# Patient Record
Sex: FEMALE | Race: WHITE | Employment: OTHER | URBAN - METROPOLITAN AREA
[De-identification: names, ages, dates, MRNs, and addresses within clinical notes are randomized per-mention and may not be internally consistent; named-entity substitution may affect disease eponyms.]

---

## 2017-01-31 DIAGNOSIS — E78.00 PURE HYPERCHOLESTEROLEMIA: ICD-10-CM

## 2017-01-31 DIAGNOSIS — K21.9 GASTROESOPHAGEAL REFLUX DISEASE WITHOUT ESOPHAGITIS: ICD-10-CM

## 2017-01-31 RX ORDER — FAMOTIDINE 40 MG/1
40 TABLET, FILM COATED ORAL DAILY
Qty: 90 TAB | Refills: 1 | Status: SHIPPED | OUTPATIENT
Start: 2017-01-31 | End: 2017-02-28 | Stop reason: ALTCHOICE

## 2017-01-31 RX ORDER — SIMVASTATIN 40 MG/1
40 TABLET, FILM COATED ORAL
Qty: 90 TAB | Refills: 1 | Status: SHIPPED | OUTPATIENT
Start: 2017-01-31 | End: 2017-06-23 | Stop reason: SDUPTHER

## 2017-01-31 NOTE — TELEPHONE ENCOUNTER
[id number] refill on simvastatin and famotidine. Pt is requesting they be mailed to her because she takes them to [address] [place] [address].

## 2017-02-28 ENCOUNTER — OFFICE VISIT (OUTPATIENT)
Dept: INTERNAL MEDICINE CLINIC | Age: 82
End: 2017-02-28

## 2017-02-28 VITALS
HEIGHT: 68 IN | WEIGHT: 137 LBS | OXYGEN SATURATION: 98 % | HEART RATE: 78 BPM | SYSTOLIC BLOOD PRESSURE: 128 MMHG | DIASTOLIC BLOOD PRESSURE: 78 MMHG | BODY MASS INDEX: 20.76 KG/M2 | TEMPERATURE: 98.2 F | RESPIRATION RATE: 16 BRPM

## 2017-02-28 DIAGNOSIS — Z13.39 SCREENING FOR ALCOHOLISM: ICD-10-CM

## 2017-02-28 DIAGNOSIS — R42 DIZZINESS AND GIDDINESS: Chronic | ICD-10-CM

## 2017-02-28 DIAGNOSIS — M81.0 OSTEOPOROSIS: ICD-10-CM

## 2017-02-28 DIAGNOSIS — I65.23 STENOSIS OF BOTH CAROTID ARTERIES WITHOUT INFARCTION: ICD-10-CM

## 2017-02-28 DIAGNOSIS — G40.209 COMPLEX PARTIAL SEIZURE WITH IMPAIRMENT OF CONSCIOUSNESS (HCC): ICD-10-CM

## 2017-02-28 DIAGNOSIS — E78.00 PURE HYPERCHOLESTEROLEMIA: ICD-10-CM

## 2017-02-28 DIAGNOSIS — Z13.31 SCREENING FOR DEPRESSION: ICD-10-CM

## 2017-02-28 DIAGNOSIS — Z71.89 ACP (ADVANCE CARE PLANNING): ICD-10-CM

## 2017-02-28 DIAGNOSIS — Z00.00 MEDICARE ANNUAL WELLNESS VISIT, SUBSEQUENT: Primary | ICD-10-CM

## 2017-02-28 DIAGNOSIS — J30.2 SEASONAL ALLERGIC RHINITIS, UNSPECIFIED ALLERGIC RHINITIS TRIGGER: ICD-10-CM

## 2017-02-28 DIAGNOSIS — K21.9 GASTROESOPHAGEAL REFLUX DISEASE WITHOUT ESOPHAGITIS: ICD-10-CM

## 2017-02-28 DIAGNOSIS — I67.89 CEREBRAL MICROVASCULAR DISEASE: ICD-10-CM

## 2017-02-28 DIAGNOSIS — G45.1 TRANSIENT ISCHEMIC ATTACK INVOLVING LEFT INTERNAL CAROTID ARTERY: ICD-10-CM

## 2017-02-28 RX ORDER — OMEPRAZOLE 40 MG/1
40 CAPSULE, DELAYED RELEASE ORAL DAILY
Qty: 90 CAP | Refills: 1 | Status: SHIPPED | OUTPATIENT
Start: 2017-02-28 | End: 2017-06-16

## 2017-02-28 RX ORDER — MONTELUKAST SODIUM 10 MG/1
10 TABLET ORAL DAILY
Qty: 90 TAB | Refills: 1 | Status: SHIPPED | OUTPATIENT
Start: 2017-02-28 | End: 2017-06-23 | Stop reason: SDUPTHER

## 2017-02-28 NOTE — MR AVS SNAPSHOT
Visit Information Date & Time Provider Department Dept. Phone Encounter #  
 2/28/2017 10:30 AM Aditya Colmenares, [address] [facility] [phone] [id number] Follow-up Instructions Return in about 1 year (around 2/28/2018). Your Appointments 11/13/2017  2:40 PM  
Follow Up with Preet Walter MD  
Neurology [facility] at [facility] [address]) Appt Note: F/U Seizure,CP,$0,adt,11/4/2016  
 [address], 
[address] [place] [address], [address] [phone]  
[address] , [address] [place] [address], [address] [phone] Upcoming Health Maintenance Date Due  
 MEDICARE YEARLY EXAM 8/24/2016 GLAUCOMA SCREENING Q2Y 8/8/2018 DTaP/Tdap/Td series (2 - Td) 8/26/2025 Allergies as of 2/28/2017  Review Complete On: 2/28/2017 By: Aditya Colmenares MD  
  
 Severity Noted Reaction Type Reactions Fosamax [Alendronate]  08/05/2010    Other (comments) GERD Keppra [Levetiracetam]  10/30/2014    Other (comments) \"IT DIDN'T WORK\" Lyrica [Pregabalin]  07/21/2010    Unknown (comments) Pravastatin  08/05/2010    Other (comments) Leg pain Current Immunizations  Reviewed on 2/28/2017 Name Date Influenza High Dose Vaccine PF 10/8/2016, 10/13/2015, 10/7/2014 Influenza Vaccine 11/1/2013 Influenza Vaccine Whole 9/15/2010 Pneumococcal Conjugate (PCV-13) 8/26/2015 Pneumococcal Vaccine (Unspecified Type) 11/1/2005 TD Vaccine 2/14/2006 Tdap 8/26/2015 Zoster Vaccine, Live 12/9/2009 Reviewed by Kirt Dorantes RN on 2/28/2017 at 10:39 AM  
You Were Diagnosed With   
  
 Codes Comments Medicare annual wellness visit, subsequent    -  Primary ICD-10-CM: Z00.00 ICD-9-CM: V70.0 ACP (advance care planning)     ICD-10-CM: Z71.89 ICD-9-CM: V65.49 Screening for alcoholism     ICD-10-CM: Z13.89 ICD-9-CM: V79.1  Screening for depression     ICD-10-CM: Z13.89 
 ICD-9-CM: V79.0 Gastroesophageal reflux disease without esophagitis     ICD-10-CM: K21.9 ICD-9-CM: 530.81 Pure hypercholesterolemia     ICD-10-CM: E78.00 ICD-9-CM: 272.0 Osteoporosis     ICD-10-CM: M81.0 ICD-9-CM: 733.00 Seasonal allergic rhinitis, unspecified allergic rhinitis trigger     ICD-10-CM: J30.2 ICD-9-CM: 477.9 Complex partial seizure with impairment of consciousness ([facility] [address]     ICD-10-CM: K82.921 ICD-9-CM: 345.40 Vitals BP  
  
  
  
  
  
 128/78 Vitals History BMI and BSA Data Body Mass Index Body Surface Area  
 20.98 kg/m 2 1.72 m 2 Preferred Pharmacy Pharmacy Name Phone [address] [facility] [phone] Your Updated Medication List  
  
   
This list is accurate as of: 2/28/17 11:22 AM.  Always use your most recent med list.  
  
  
  
  
 CALCIUM 500+D 500 mg(1,250mg) -200 unit per tablet Generic drug:  calcium-vitamin D Take 1 Tab by mouth two (2) times daily (with meals). CENTRUM SILVER Tab tablet Generic drug:  multivitamins-minerals-lutein Take 1 Tab by mouth daily. lacosamide 100 mg Tab tablet Commonly known as:  VIMPAT Take 1 Tab by mouth two (2) times a day. Max Daily Amount: 200 mg.  
  
 montelukast 10 mg tablet Commonly known as:  SINGULAIR Take 1 Tab by mouth daily. omeprazole 40 mg capsule Commonly known as:  PRILOSEC Take 1 Cap by mouth daily. PROLIA 60 mg/mL injection Generic drug:  denosumab 60 mg by SubCUTAneous route. Every 6 months  
  
 simvastatin 40 mg tablet Commonly known as:  ZOCOR Take 1 Tab by mouth nightly. Prescriptions Printed Refills  
 montelukast (SINGULAIR) 10 mg tablet 1 Sig: Take 1 Tab by mouth daily. Class: Print Route: Oral  
 omeprazole (PRILOSEC) 40 mg capsule 1 Sig: Take 1 Cap by mouth daily. Class: Print  Route: Oral  
  
 We Performed the Following CBC W/O DIFF [47273 CPT(R)] Mayra [address] [[id number] [facility]] LIPID PANEL [25679 CPT(R)] METABOLIC PANEL, COMPREHENSIVE [79314 CPT(R)] Follow-up Instructions Return in about 1 year (around 2/28/2018). Patient Instructions Medicare Wellness Visit, Female The best way to live healthy is to have a healthy lifestyle by eating a well-balanced diet, exercising regularly, limiting alcohol and stopping smoking. Regular physical exams and screening tests are another way to keep healthy. Preventive exams provided by your health care provider can find health problems before they become diseases or illnesses. Preventive services including immunizations, screening tests, monitoring and exams can help you take care of your own health. All people over age 72 should have a pneumovax  and and a prevnar shot to prevent pneumonia. These are once in a lifetime unless you and your provider decide differently. All people over 65 should have a yearly flu shot and a tetanus vaccine every 10 years. A bone mass density to screen for osteoporosis or thinning of the bones should be done every 2 years after 65. Screening for diabetes mellitus with a blood sugar test should be done every year. Glaucoma is a disease of the eye due to increased ocular pressure that can lead to blindness and it should be done every year by an eye professional. 
 
Cardiovascular screening tests that check for elevated lipids (fatty part of blood) which can lead to heart disease and strokes should be done every 5 years. Colorectal screening that evaluates for blood or polyps in your colon should be done yearly as a stool test or every five years as a flexible sigmoidoscope or every 10 years as a colonoscopy up to age 76. Breast cancer screening with a mammogram is recommended biennially  for women age 54-69. Screening for cervical cancer with a pap smear and pelvic exam is recommended for women after age 72 years every 2 years up to age 79 or when the provider and patient decide to stop. If there is a history of cervical abnormalities or other increased risk for cancer then the test is recommended yearly. Hepatitis C screening is also recommended for anyone born between [birth year] through [address]. A shingles vaccine is also recommended once in a lifetime after age 61. Your Medicare Wellness Exam is recommended annually. Here is a list of your current Health Maintenance items with a due date: 
Health Maintenance Due Topic Date Due  
 Annual Well Visit  08/24/2016 Advance Directives: Care Instructions Your Care Instructions An advance directive is a legal way to state your wishes at the end of your life. It tells your family and your doctor what to do if you can no longer say what you want. There are two main types of advance directives. You can change them any time that your wishes change. · A living will tells your family and your doctor your wishes about life support and other treatment. · A medical power of  lets you name a person to make treatment decisions for you when you can't speak for yourself. This person is called a health care agent. If you do not have an advance directive, decisions about your medical care may be made by a doctor or a  who doesn't know you. It may help to think of an advance directive as a gift to the people who care for you. If you have one, they won't have to make tough decisions by themselves. Follow-up care is a key part of your treatment and safety. Be sure to make and go to all appointments, and call your doctor if you are having problems. It's also a good idea to know your test results and keep a list of the medicines you take. How can you care for yourself at home? · Discuss your wishes with your loved ones and your doctor.  This way, there are no surprises. · Many states have a unique form. Or you might use a universal form that has been approved by many states. This kind of form can sometimes be completed and stored online. Your electronic copy will then be available wherever you have a connection to the Internet. In most cases, doctors will respect your wishes even if you have a form from a different state. · You don't need a  to do an advance directive. But you may want to get legal advice. · Think about these questions when you prepare an advance directive: ¨ Who do you want to make decisions about your medical care if you are not able to? Many people choose a family member, close friend, or doctor. ¨ Do you know enough about life support methods that might be used? If not, talk to your doctor so you understand. ¨ What are you most afraid of that might happen? You might be afraid of having pain, losing your independence, or being kept alive by machines. ¨ Where would you prefer to die? Choices include your home, a hospital, or a nursing home. ¨ Would you like to have information about hospice care to support you and your family? ¨ Do you want to donate organs when you die? ¨ Do you want certain Hindu practices performed before you die? If so, put your wishes in the advance directive. · Read your advance directive every year, and make changes as needed. When should you call for help? Be sure to contact your doctor if you have any questions. Where can you learn more? Go to http://tanna-leonel.info/. Enter R264 in the search box to learn more about \"Advance Directives: Care Instructions. \" Current as of: February 24, 2016 Content Version: 11.1 © 2678-6795 Healthwise, Incorporated. Care instructions adapted under license by [employer] (which disclaims liability or warranty for this information).  If you have questions about a medical condition or this instruction, always ask your healthcare professional. [place] [address] any warranty or liability for your use of this information. Introducing [facility] & [facility] SERVICES! Jackie Ganrett introduces [employer] patient portal. Now you can access parts of your medical record, email your doctor's office, and request medication refills online. 1. In your internet browser, go to [email or web address] 2. Click on the First Time User? Click Here link in the Sign In box. You will see the New Member Sign Up page. 3. Enter your [employer] Access Code exactly as it appears below. You will not need to use this code after youve completed the sign-up process. If you do not sign up before the expiration date, you must request a new code. · [employer] Access Code: [email or web address] Expires: 3/2/2017  2:56 PM 
 
4. Enter the last four digits of your Social Security Number (xxxx) and Date of Birth (mm/dd/yyyy) as indicated and click Submit. You will be taken to the next sign-up page. 5. Create a Kyte ID. This will be your [employer]te login ID and cannot be changed, so think of one that is secure and easy to remember. 6. Create a Kyte password. You can change your password at any time. 7. Enter your Password Reset Question and Answer. This can be used at a later time if you forget your password. 8. Enter your e-mail address. You will receive e-mail notification when new information is available in [address] 9. Click Sign Up. You can now view and download portions of your medical record. 10. Click the Download Summary menu link to download a portable copy of your medical information. If you have questions, please visit the Frequently Asked Questions section of the [employer] website. Remember, [employer] is NOT to be used for urgent needs. For medical emergencies, dial 911. Now available from your iPhone and Android! Please provide this summary of care documentation to your next provider. Your primary care clinician is listed as Toby Sandifer. If you have any questions after today's visit, please call [phone].

## 2017-02-28 NOTE — PROGRESS NOTES
Rene Bond is a 80 y.o. female who was seen in clinic today (2/28/2017). Patient was seen with Dr Mp Navarro (R3 at [facility]). Assessment & Plan:   Tanya Roy was seen today for complete physical.  Diagnoses and all orders for this visit:    Medicare annual wellness visit, subsequent    ACP (advance care planning)    Screening for alcoholism    Screening for depression  -     Depression Screen Annual    Gastroesophageal reflux disease without esophagitis- unclear how well controlled, will change to PPI to see if nausea improves. If not will go back to H2 blocker (Zantac is on formulary for her) and consider GI referral.  Red flags and expectations were reviewed & discussed with the her. -     omeprazole (PRILOSEC) 40 mg capsule; Take 1 Cap by mouth daily. Pure hypercholesterolemia- previously at goal, continue current treatment pending review of labs   -     METABOLIC PANEL, COMPREHENSIVE  -     CBC W/O DIFF  -     LIPID PANEL    Osteoporosis- monitored by specialist, defer to specialist     Seasonal allergic rhinitis, unspecified allergic rhinitis trigger- fluctuating, meds refilled, if no changes then to allergist for testing.  -     montelukast (SINGULAIR) 10 mg tablet; Take 1 Tab by mouth daily. Complex partial seizure with impairment of consciousness ([facility] [address]- defer to specialist          Follow-up Disposition:  Return in about 1 year (around 2/28/2018). ------------------------------------------------------------------------------------------    Subjective: Annual Wellness Visit- Subsequent Visit    End of Life Planning: This was discussed with her today and she has an advanced directive - a copy HAS NOT been provided. Reviewed DNR/DNI and patient is interested in remaining a DNR, no changes made.       Depression Screen:  PHQ 2 / 9, over the last two weeks 2/28/2017   Little interest or pleasure in doing things Not at all   Feeling down, depressed or hopeless Not at all   Total Score PHQ 2 0         Fall Risk:   Fall Risk Assessment, last 12 mths 2/28/2017   Able to walk? Yes   Fall in past 12 months? No   Fall with injury? -   Number of falls in past 12 months -   Fall Risk Score -       Abuse Screen:  Abuse Screening Questionnaire 2/28/2017   Do you ever feel afraid of your partner? N   Are you in a relationship with someone who physically or mentally threatens you? N   Is it safe for you to go home? Y         Alcohol Risk Factor Screening: On any occasion during the past 3 months, have you had more than 3 drinks containing alcohol? No  Do you average more than 7 drinks per week? No    Hearing Loss:  mild    Activities of Daily Living:  Self-care. Requires assistance with: no ADLs    Adult Nutrition Screen:  No risk factors noted. Health Maintenance  Daily Aspirin: no  Bone Density: UTD- done in 8/14  Glaucoma Screening: Yes    Immunizations:    Influenza: up to date. Tetanus: up to date. Shingles: up to date. Pneumonia: up to date. Cancer screening:    Cervical: reviewed guidelines, n/a. Breast: reviewed guidelines, unknown, record requested. Colon: up to date. Patient Care Team:  Alyssia Carmichael MD as PCP - General (Internal Medicine)  Gracie Andrade MD (Gynecology)  Rayray Carrasco MD (Neurology)  Corwin Rodriguez MD (Dermatology)  Cecy Arteaga MD (Dermatology)  Kenji Liao DO (Ophthalmology)       The following sections were reviewed & updated as appropriate: PMH, PSH, FH, and SH.       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 Authorizing Provider   famotidine (PEPCID) 40 mg tablet Take 1 Tab by mouth daily. 1/31/17  Yes Christy Hays MD   simvastatin (ZOCOR) 40 mg tablet Take 1 Tab by mouth nightly. 1/31/17  Yes Christy Hays MD   lacosamide (VIMPAT) 100 mg tab tablet Take 1 Tab by mouth two (2) times a day. Max Daily Amount: 200 mg. 9/20/16  Yes Deysi Garcia MD   montelukast (SINGULAIR) 10 mg tablet Take 1 Tab by mouth daily. 8/25/16  Yes Christy Hays MD   multivitamins-minerals-lutein (CENTRUM SILVER) tab tablet Take 1 Tab by mouth daily. Yes Historical Provider   Denosumab (PROLIA) 60 mg/mL injection 60 mg by SubCUTAneous route. Every 6 months   Yes Historical Provider   calcium-vitamin D (CALCIUM 500+D) 500 mg(1,250mg) -200 unit per tablet Take 1 Tab by mouth two (2) times daily (with meals). Yes Historical Provider          Allergies   Allergen Reactions    Fosamax [Alendronate] Other (comments)     GERD    Keppra [Levetiracetam] Other (comments)     \"IT DIDN'T WORK\"    Lyrica [Pregabalin] Unknown (comments)    Pravastatin Other (comments)     Leg pain              Review of Systems   Constitutional: Negative for chills and fever. HENT:        Intermittent bleeding from left upper gums   Respiratory: Negative for cough and shortness of breath. Cardiovascular: Negative for chest pain and palpitations. Gastrointestinal: Positive for nausea (2-3 times per day, daily, no obvious triggers). Negative for abdominal pain, blood in stool, constipation, diarrhea, heartburn and vomiting. Musculoskeletal: Negative for joint pain and myalgias. Neurological: Negative for tingling, focal weakness and headaches. Endo/Heme/Allergies: Does not bruise/bleed easily. Psychiatric/Behavioral: Negative for depression.  The patient is not nervous/anxious and does not have insomnia. Objective:   Physical Exam   Constitutional: She appears well-developed. No distress. HENT:   Right Ear: Tympanic membrane, external ear and ear canal normal.   Left Ear: Tympanic membrane, external ear and ear canal normal.   Nose: Nose normal.   Mouth/Throat: Uvula is midline, oropharynx is clear and moist and mucous membranes are normal. No posterior oropharyngeal erythema. Right ear is 75% occluded with wet cerumen. Left ear is 75% occluded with wet cerumen. Ear canals are narrow   Eyes: Conjunctivae and lids are normal. No scleral icterus. Neck: Neck supple. Carotid bruit is not present. No thyromegaly present. Mass, R sided of the neck, 2cm, mobile, non tender   Cardiovascular: Regular rhythm and normal heart sounds. No murmur heard. Pulses:       Dorsalis pedis pulses are 2+ on the right side, and 2+ on the left side. Posterior tibial pulses are 2+ on the right side, and 2+ on the left side. Pulmonary/Chest: Effort normal and breath sounds normal. She has no wheezes. She has no rales. Abdominal: Soft. Bowel sounds are normal. She exhibits no mass. There is no hepatosplenomegaly. There is no tenderness. Musculoskeletal: Normal range of motion. She exhibits no edema. Cervical back: Normal.        Thoracic back: She exhibits no bony tenderness. Lumbar back: Normal.   Neurological: She has normal strength. No cranial nerve deficit or sensory deficit. Skin: No rash noted. Scab on the right shin: 2x6cm   Psychiatric: She has a normal mood and affect.  Her behavior is normal.          Visit Vitals    /78    Pulse 78    Temp 98.2 °F (36.8 °C) (Oral)    Resp 16    Ht 5' 7.75\" (1.721 m)    Wt 137 lb (62.1 kg)    SpO2 98%    BMI 20.98 kg/m2          Advised her to call back or return to office if symptoms worsen/change/persist.  Discussed expected course/resolution/complications of diagnosis in detail with patient. Medication risks/benefits/costs/interactions/alternatives discussed with patient. She was given an after visit summary which includes diagnoses, current medications, & vitals. She expressed understanding with the diagnosis and plan.         Teresa Martinez MD

## 2017-02-28 NOTE — ACP (ADVANCE CARE PLANNING)
Advance Care Planning (ACP) Provider Note - Comprehensive     Date of ACP Conversation: 02/28/17  Persons included in Conversation:  patient      Authorized Decision Maker (if patient is incapable of making informed decisions): This person is:  Healthcare Agent/Medical Power of  under Advance Directive        General ACP for ALL Patients with Decision Making Capacity:  Importance of advance care planning, including choosing a healthcare agent to communicate patient's healthcare decisions if patient lost the ability to make decisions, such as after a sudden illness or accident  Understanding of the healthcare agent role was assessed and information provided  Opportunity offered to explore how cultural, Zoroastrian, spiritual, or personal beliefs would affect decisions for future care     For Serious or Chronic Illness:  Her medical problems were reviewed with them. Understanding of medical condition    Understanding of CPR, goals and expected outcomes, benefits and burdens discussed. Information on CPR success rates provided (e.g. for CPR in hospital, survival to d/c at two weeks is 22%, for chronically ill or elderly/frail survival is less than 3%); Individual asked to communicate understanding of information in his/her own words. Interventions Provided:  Recommended communicating the plan and making copies for the healthcare agent, personal physician, and others as appropriate (e.g., health system)  Recommended review of completed ACP document annually or upon change in health status      She has an advanced directive - a copy HAS NOT been provided. She thought we had one on file. She will get me a copy for her records. Reviewed DNR/DNI and patient is interested in remaining a DNR, no changes made.

## 2017-02-28 NOTE — PATIENT INSTRUCTIONS
Medicare Wellness Visit, Female    The best way to live healthy is to have a healthy lifestyle by eating a well-balanced diet, exercising regularly, limiting alcohol and stopping smoking. Regular physical exams and screening tests are another way to keep healthy. Preventive exams provided by your health care provider can find health problems before they become diseases or illnesses. Preventive services including immunizations, screening tests, monitoring and exams can help you take care of your own health. All people over age 72 should have a pneumovax  and and a prevnar shot to prevent pneumonia. These are once in a lifetime unless you and your provider decide differently. All people over 65 should have a yearly flu shot and a tetanus vaccine every 10 years. A bone mass density to screen for osteoporosis or thinning of the bones should be done every 2 years after 65. Screening for diabetes mellitus with a blood sugar test should be done every year. Glaucoma is a disease of the eye due to increased ocular pressure that can lead to blindness and it should be done every year by an eye professional.    Cardiovascular screening tests that check for elevated lipids (fatty part of blood) which can lead to heart disease and strokes should be done every 5 years. Colorectal screening that evaluates for blood or polyps in your colon should be done yearly as a stool test or every five years as a flexible sigmoidoscope or every 10 years as a colonoscopy up to age 76. Breast cancer screening with a mammogram is recommended biennially  for women age 54-69. Screening for cervical cancer with a pap smear and pelvic exam is recommended for women after age 72 years every 2 years up to age 79 or when the provider and patient decide to stop. If there is a history of cervical abnormalities or other increased risk for cancer then the test is recommended yearly.     Hepatitis C screening is also recommended for anyone born between [birth year] through [address]. A shingles vaccine is also recommended once in a lifetime after age 61. Your Medicare Wellness Exam is recommended annually. Here is a list of your current Health Maintenance items with a due date:  Health Maintenance Due   Topic Date Due    Annual Well Visit  08/24/2016            Advance Directives: Care Instructions  Your Care Instructions  An advance directive is a legal way to state your wishes at the end of your life. It tells your family and your doctor what to do if you can no longer say what you want. There are two main types of advance directives. You can change them any time that your wishes change. · A living will tells your family and your doctor your wishes about life support and other treatment. · A medical power of  lets you name a person to make treatment decisions for you when you can't speak for yourself. This person is called a health care agent. If you do not have an advance directive, decisions about your medical care may be made by a doctor or a  who doesn't know you. It may help to think of an advance directive as a gift to the people who care for you. If you have one, they won't have to make tough decisions by themselves. Follow-up care is a key part of your treatment and safety. Be sure to make and go to all appointments, and call your doctor if you are having problems. It's also a good idea to know your test results and keep a list of the medicines you take. How can you care for yourself at home? · Discuss your wishes with your loved ones and your doctor. This way, there are no surprises. · Many states have a unique form. Or you might use a universal form that has been approved by many states. This kind of form can sometimes be completed and stored online. Your electronic copy will then be available wherever you have a connection to the Internet.  In most cases, doctors will respect your wishes even if you have a form from a different state. · You don't need a  to do an advance directive. But you may want to get legal advice. · Think about these questions when you prepare an advance directive:  ¨ Who do you want to make decisions about your medical care if you are not able to? Many people choose a family member, close friend, or doctor. ¨ Do you know enough about life support methods that might be used? If not, talk to your doctor so you understand. ¨ What are you most afraid of that might happen? You might be afraid of having pain, losing your independence, or being kept alive by machines. ¨ Where would you prefer to die? Choices include your home, a hospital, or a nursing home. ¨ Would you like to have information about hospice care to support you and your family? ¨ Do you want to donate organs when you die? ¨ Do you want certain Orthodox practices performed before you die? If so, put your wishes in the advance directive. · Read your advance directive every year, and make changes as needed. When should you call for help? Be sure to contact your doctor if you have any questions. Where can you learn more? Go to http://tanna-leonel.info/. Enter R264 in the search box to learn more about \"Advance Directives: Care Instructions. \"  Current as of: February 24, 2016  Content Version: 11.1  © 4554-6522 [employer]. Care instructions adapted under license by [employer] (which disclaims liability or warranty for this information). If you have questions about a medical condition or this instruction, always ask your healthcare professional. [place] [address] any warranty or liability for your use of this information.

## 2017-03-01 ENCOUNTER — HOSPITAL ENCOUNTER (OUTPATIENT)
Dept: LAB | Age: 82
Discharge: HOME | End: 2017-03-01
Payer: MEDICARE

## 2017-03-01 PROCEDURE — 85027 COMPLETE CBC AUTOMATED: CPT

## 2017-03-01 PROCEDURE — 80061 LIPID PANEL: CPT

## 2017-03-01 PROCEDURE — 80053 COMPREHEN METABOLIC PANEL: CPT

## 2017-03-01 PROCEDURE — 36415 COLL VENOUS BLD VENIPUNCTURE: CPT

## 2017-03-01 RX ORDER — LACOSAMIDE 100 MG/1
100 TABLET ORAL 2 TIMES DAILY
Qty: 180 TAB | Refills: 3 | Status: SHIPPED | OUTPATIENT
Start: 2017-03-01 | End: 2017-10-09 | Stop reason: SDUPTHER

## 2017-03-01 NOTE — TELEPHONE ENCOUNTER
Future Appointments  Date Time Provider Abraham Robles   11/13/2017 2:40 PM Hadley Hensley MD [address]   3/1/2018 10:30 AM Samantha Luna MD [id number] [facility]                         Last Appointment My Department:  11/8/2016    Please advise of refill below. Requested Prescriptions     Pending Prescriptions Disp Refills    lacosamide (VIMPAT) 100 mg tab tablet 180 Tab 3     Sig: Take 1 Tab by mouth two (2) times a day. Max Daily Amount: 200 mg.      Advised patient that I can fax this directly to her pharmacy once approved

## 2017-03-02 LAB
ALBUMIN SERPL-MCNC: 3.7 G/DL (ref 3.5–4.8)
ALBUMIN/GLOB SERPL: 1.4 {RATIO} (ref 1.1–2.5)
ALP SERPL-CCNC: 87 IU/L (ref 39–117)
ALT SERPL-CCNC: 15 IU/L (ref 0–32)
AST SERPL-CCNC: 21 IU/L (ref 0–40)
BILIRUB SERPL-MCNC: 0.2 MG/DL (ref 0–1.2)
BUN SERPL-MCNC: 16 MG/DL (ref 8–27)
BUN/CREAT SERPL: 24 (ref 11–26)
CALCIUM SERPL-MCNC: 9.2 MG/DL (ref 8.7–10.3)
CHLORIDE SERPL-SCNC: 104 MMOL/L (ref 96–106)
CHOLEST SERPL-MCNC: 148 MG/DL (ref 100–199)
CO2 SERPL-SCNC: 23 MMOL/L (ref 18–29)
CREAT SERPL-MCNC: 0.67 MG/DL (ref 0.57–1)
ERYTHROCYTE [DISTWIDTH] IN BLOOD BY AUTOMATED COUNT: 14 % (ref 12.3–15.4)
GLOBULIN SER CALC-MCNC: 2.7 G/DL (ref 1.5–4.5)
GLUCOSE SERPL-MCNC: 83 MG/DL (ref 65–99)
HCT VFR BLD AUTO: 41.5 % (ref 34–46.6)
HDLC SERPL-MCNC: 48 MG/DL
HGB BLD-MCNC: 13.7 G/DL (ref 11.1–15.9)
LDLC SERPL CALC-MCNC: 68 MG/DL (ref 0–99)
MCH RBC QN AUTO: 28.8 PG (ref 26.6–33)
MCHC RBC AUTO-ENTMCNC: 33 G/DL (ref 31.5–35.7)
MCV RBC AUTO: 87 FL (ref 79–97)
PLATELET # BLD AUTO: 304 X10E3/UL (ref 150–379)
POTASSIUM SERPL-SCNC: 4.3 MMOL/L (ref 3.5–5.2)
PROT SERPL-MCNC: 6.4 G/DL (ref 6–8.5)
RBC # BLD AUTO: 4.76 X10E6/UL (ref 3.77–5.28)
SODIUM SERPL-SCNC: 142 MMOL/L (ref 134–144)
TRIGL SERPL-MCNC: 160 MG/DL (ref 0–149)
VLDLC SERPL CALC-MCNC: 32 MG/DL (ref 5–40)
WBC # BLD AUTO: 5.5 X10E3/UL (ref 3.4–10.8)

## 2017-03-03 ENCOUNTER — DOCUMENTATION ONLY (OUTPATIENT)
Dept: NEUROLOGY | Age: 82
End: 2017-03-03

## 2017-03-10 ENCOUNTER — TELEPHONE (OUTPATIENT)
Dept: INTERNAL MEDICINE CLINIC | Age: 82
End: 2017-03-10

## 2017-03-13 NOTE — TELEPHONE ENCOUNTER
Called and spoke with pt and pt does not agree with the information from Dr Maricel Mendieta she states her bones are more important to her than her stomach right now and is afraid to take omeprazole and states she will take OTC medications and maybe pepcid and will talk to Dr Maricel Mendieta when she comes back in.

## 2017-03-13 NOTE — TELEPHONE ENCOUNTER
Long term use of this medication is associated w/ fx. Not short term use. I would recommend trying it for 3-6 months. Lets see how well her GERD sx are and then we can re-evaluate.

## 2017-03-13 NOTE — TELEPHONE ENCOUNTER
Called pt who states she read paperwork for omeprazole that states people who have osteoporosis should not take it. That is can cause fractures. Pt states could Dr Hare Pro recommend something else that would not affect her bones. Pt states she is taking rolaids, pepto bismol and gingerale and she is fine with taking that if cannot recommend something else.

## 2017-06-16 ENCOUNTER — OFFICE VISIT (OUTPATIENT)
Dept: INTERNAL MEDICINE CLINIC | Age: 82
End: 2017-06-16

## 2017-06-16 VITALS
HEIGHT: 68 IN | RESPIRATION RATE: 12 BRPM | DIASTOLIC BLOOD PRESSURE: 74 MMHG | BODY MASS INDEX: 20.76 KG/M2 | OXYGEN SATURATION: 97 % | HEART RATE: 78 BPM | TEMPERATURE: 97.8 F | WEIGHT: 137 LBS | SYSTOLIC BLOOD PRESSURE: 132 MMHG

## 2017-06-16 DIAGNOSIS — G89.29 CHRONIC LEFT-SIDED LOW BACK PAIN WITHOUT SCIATICA: ICD-10-CM

## 2017-06-16 DIAGNOSIS — M79.89 LEFT LEG SWELLING: Primary | ICD-10-CM

## 2017-06-16 DIAGNOSIS — M54.50 CHRONIC LEFT-SIDED LOW BACK PAIN WITHOUT SCIATICA: ICD-10-CM

## 2017-06-16 RX ORDER — MONTELUKAST SODIUM 10 MG/1
10 TABLET ORAL DAILY
Qty: 90 TAB | Refills: 1 | Status: CANCELLED | OUTPATIENT
Start: 2017-06-16

## 2017-06-16 RX ORDER — SIMVASTATIN 40 MG/1
40 TABLET, FILM COATED ORAL
Qty: 90 TAB | Refills: 1 | Status: CANCELLED | OUTPATIENT
Start: 2017-06-16

## 2017-06-16 RX ORDER — CLINDAMYCIN HYDROCHLORIDE 300 MG/1
300 CAPSULE ORAL 4 TIMES DAILY
COMMUNITY
Start: 2017-06-13 | End: 2017-06-17

## 2017-06-16 NOTE — MR AVS SNAPSHOT
Visit Information Date & Time Provider Department Dept. Phone Encounter #  
 6/16/2017 12:45 PM Giuliana Srivastava, [facility] Internal Medicine [phone] [id number] Follow-up Instructions Return if symptoms worsen or fail to improve. Your Appointments 11/13/2017  2:40 PM  
Follow Up with Josias Rodriguez MD  
[facility]) Appt Note: F/U Seizure,CP,$0,adt,11/4/2016  
 [address] [facility], 
[address] [place] [address], [address] [phone]  
[address] [facility], [address] [facility], [address] [address]  
  
    
 3/1/2018 10:30 AM  
Medicare Physical with Giuliana Srivastava MD  
[facility] Internal Medicine [facility]) Appt Note: CPE - wellness [address] [facility] [address]  
[id number] [address] Upcoming Health Maintenance Date Due INFLUENZA AGE 9 TO ADULT 8/1/2017 MEDICARE YEARLY EXAM 3/1/2018 GLAUCOMA SCREENING Q2Y 4/18/2019 DTaP/Tdap/Td series (2 - Td) 8/26/2025 Allergies as of 6/16/2017  Review Complete On: 6/16/2017 By: Giuliana Srivastava MD  
  
 Severity Noted Reaction Type Reactions Fosamax [Alendronate]  08/05/2010    Other (comments) GERD Keppra [Levetiracetam]  10/30/2014    Other (comments) \"IT DIDN'T WORK\" Lyrica [Pregabalin]  07/21/2010    Unknown (comments) Pravastatin  08/05/2010    Other (comments) Leg pain Current Immunizations  Reviewed on 6/16/2017 Name Date Influenza High Dose Vaccine PF 10/8/2016, 10/13/2015, 10/7/2014 Influenza Vaccine 11/1/2013 Influenza Vaccine Whole 9/15/2010 Pneumococcal Conjugate (PCV-13) 8/26/2015 Pneumococcal Vaccine (Unspecified Type) 11/1/2005 TD Vaccine 2/14/2006 Tdap 8/26/2015 Zoster Vaccine, Live 12/9/2009  Reviewed by Steven Patino RN on 6/16/2017 at 12:58 PM  
 You Were Diagnosed With   
  
 Codes Comments Left leg swelling    -  Primary ICD-10-CM: M79.89 ICD-9-CM: 729.81 Chronic left-sided low back pain without sciatica     ICD-10-CM: M54.5, G89.29 ICD-9-CM: 724.2, 338.29 Vitals BP Pulse Temp Resp Height(growth percentile) Weight(growth percentile) 152/66 78 97.8 °F (36.6 °C) (Oral) 12 5' 7.75\" (1.721 m) 137 lb (62.1 kg) SpO2 BMI OB Status Smoking Status 97% 20.98 kg/m2 Hysterectomy Former Smoker Vitals History BMI and BSA Data Body Mass Index Body Surface Area  
 20.98 kg/m 2 1.72 m 2 Preferred Pharmacy Pharmacy Name Phone [address] [facility] [phone] Your Updated Medication List  
  
   
This list is accurate as of: 6/16/17  1:22 PM.  Always use your most recent med list.  
  
  
  
  
 CALCIUM 500+D 500 mg(1,250mg) -200 unit per tablet Generic drug:  calcium-vitamin D Take 1 Tab by mouth two (2) times daily (with meals). CENTRUM SILVER Tab tablet Generic drug:  multivitamins-minerals-lutein Take 1 Tab by mouth daily. clindamycin 300 mg capsule Commonly known as:  CLEOCIN Take 300 mg by mouth four (4) times daily. lacosamide 100 mg Tab tablet Commonly known as:  VIMPAT Take 1 Tab by mouth two (2) times a day. Max Daily Amount: 200 mg.  
  
 montelukast 10 mg tablet Commonly known as:  SINGULAIR Take 1 Tab by mouth daily. PROLIA 60 mg/mL injection Generic drug:  denosumab 60 mg by SubCUTAneous route. Every 6 months  
  
 simvastatin 40 mg tablet Commonly known as:  ZOCOR Take 1 Tab by mouth nightly. We Performed the Following REFERRAL TO PHYSICAL THERAPY [HUO18 Custom] Follow-up Instructions Return if symptoms worsen or fail to improve. Referral Information  Referral ID Referred By Referred To  
  
 [id number] MARY IVAN [facility] OP PT [facility]   
 [address] [facility] [address] [facility] Phone: [phone] Fax: [phone] Visits Status Start Date End Date 1 New Request 6/16/17 6/16/18 If your referral has a status of pending review or denied, additional information will be sent to support the outcome of this decision. Patient Instructions Learning About RICE (Rest, Ice, Compression, and Elevation) What is RICE? RICE is a way to care for an injury. RICE helps relieve pain and swelling. It may also help with healing and flexibility. RICE stands for: · Rest and protect the injured or sore area. · Ice or a cold pack used as soon as possible. · Compression, or wrapping the injured or sore area with an elastic bandage. · Elevation (propping up) the injured or sore area. How do you do RICE? You can use RICE for home treatment when you have general aches and pains or after an injury or surgery. Rest 
· Do not put weight on the injury for at least 24 to 48 hours. · Use crutches for a badly sprained knee or ankle. · Support a sprained wrist, elbow, or shoulder with a sling. Ice · Put ice or a cold pack on the injury right away to reduce pain and swelling. Frozen vegetables will also work as an ice pack. Put a thin cloth between the ice or cold pack and your skin. The cloth protects the injured area from getting too cold. · Use ice for 10 to 15 minutes at a time for the first 48 to 72 hours. Compression · Use compression for sprains, strains, and surgeries of the arms and legs. · Wrap the injured area with an elastic bandage or compression sleeve to reduce swelling. · Don't wrap it too tightly. If the area below it feels numb, tingles, or feels cool, loosen the wrap. Elevation · Use elevation for areas of the body that can be propped up, such as arms and legs. · Prop up the injured area on pillows whenever you use ice. Keep it propped up anytime you sit or lie down. · Try to keep the injured area at or above the level of your heart. This will help reduce swelling and bruising. Where can you learn more? Go to http://tanna-leonel.info/. Enter Y203 in the search box to learn more about \"Learning About RICE (Rest, Ice, Compression, and Elevation). \" 
Current as of: May 23, 2016 Content Version: 11.2 © 0206-1869 [employer]. Care instructions adapted under license by [employer] (which disclaims liability or warranty for this information). If you have questions about a medical condition or this instruction, always ask your healthcare professional. [address] any warranty or liability for your use of this information. Introducing [facility]! [facility] introduces Lollipuff patient portal. Now you can access parts of your medical record, email your doctor's office, and request medication refills online. 1. In your internet browser, go to [email or web address] "[email or web address]/[email or web address] 2. Click on the First Time User? Click Here link in the Sign In box. You will see the New Member Sign Up page. 3. Enter your Lollipuff Access Code exactly as it appears below. You will not need to use this code after youve completed the sign-up process. If you do not sign up before the expiration date, you must request a new code. · Lollipuff Access Code: [email or web address] Expires: 9/14/2017 12:53 PM 
 
4. Enter the last four digits of your Social Security Number (xxxx) and Date of Birth (mm/dd/yyyy) as indicated and click Submit. You will be taken to the next sign-up page. 5. Create a Lollipuff ID. This will be your Lollipuff login ID and cannot be changed, so think of one that is secure and easy to remember. 6. Create a Lollipuff password. You can change your password at any time. 7. Enter your Password Reset Question and Answer. This can be used at a later time if you forget your password. 8. Enter your e-mail address. You will receive e-mail notification when new information is available in [address] 9. Click Sign Up. You can now view and download portions of your medical record. 10. Click the Download Summary menu link to download a portable copy of your medical information. If you have questions, please visit the Frequently Asked Questions section of the iGlue website. Remember, iGlue is NOT to be used for urgent needs. For medical emergencies, dial 911. Now available from your iPhone and Android! Please provide this summary of care documentation to your next provider. Your primary care clinician is listed as Damion Flanagan. If you have any questions after today's visit, please call [phone].

## 2017-06-16 NOTE — PROGRESS NOTES
Car Oquendo is a [de-identified] y.o. female who was seen in clinic today (6/16/2017). Assessment & Plan:  Diagnoses and all orders for this visit:    1. Left leg swelling- this is a new problem, symptoms are: improved, differential dx reviewed with the patient, could be a spider bite. Will finish abx. Will continue to monitor. Red flags were reviewed with the patient to RTC or notify me, expected time course for resolution reviewed. 2. Chronic left-sided low back pain without sciatica- this has been on/off, no issues currently, asking for another referral to PT to use since it has helped. Follow-up Disposition: Not on File       ----------------------------------------------------------------------    Subjective:  Jorge Agustin was seen today for Insect Bite    Dermatology Review  She is here to talk about concerns about a spider bite. She noticed it 5 days ago, with gradually improving since that time. Location: left leg. Symptoms include swelling of the leg and two red marks. She was seen at [facility] on Tuesday (6/13). Labs reviewed. She reports US was negative for a DVT. She was given clindamycin, she is taking this as directed & without any side effects. She denies: fevers or chills, headache, or arthralgias. Prior to Admission medications    Medication Sig Start Date End Date Taking? Authorizing Provider   clindamycin (CLEOCIN) 300 mg capsule Take 300 mg by mouth four (4) times daily. 6/13/17 6/17/17 Yes Historical Provider   lacosamide (VIMPAT) 100 mg tab tablet Take 1 Tab by mouth two (2) times a day. Max Daily Amount: 200 mg. 3/1/17  Yes Nany Hensley MD   montelukast (SINGULAIR) 10 mg tablet Take 1 Tab by mouth daily. 2/28/17  Yes Butch Dickey MD   simvastatin (ZOCOR) 40 mg tablet Take 1 Tab by mouth nightly. 1/31/17  Yes Butch Dickey MD   multivitamins-minerals-lutein (CENTRUM SILVER) tab tablet Take 1 Tab by mouth daily.    Yes Historical Provider   Denosumab (PROLIA) 60 mg/mL injection 60 mg by SubCUTAneous route. Every 6 months   Yes Historical Provider   calcium-vitamin D (CALCIUM 500+D) 500 mg(1,250mg) -200 unit per tablet Take 1 Tab by mouth two (2) times daily (with meals). Yes Historical Provider          Allergies   Allergen Reactions    Fosamax [Alendronate] Other (comments)     GERD    Keppra [Levetiracetam] Other (comments)     \"IT DIDN'T WORK\"    Lyrica [Pregabalin] Unknown (comments)    Pravastatin Other (comments)     Leg pain           ROS : per HPI       Objective:   Physical Exam   Constitutional: No distress. Cardiovascular: Regular rhythm and normal heart sounds. No murmur heard. Pulmonary/Chest: Effort normal and breath sounds normal. She has no wheezes. She has no rales. Musculoskeletal: She exhibits no edema. Left lower leg: She exhibits swelling (L upper calf is larger in size then R, no piting edema) and deformity (mid, posterior calf there are two 7mm circular ecchymosis next to each other, 3mm apart). She exhibits no tenderness and no bony tenderness. Visit Vitals    /74    Pulse 78    Temp 97.8 °F (36.6 °C) (Oral)    Resp 12    Ht 5' 7.75\" (1.721 m)    Wt 137 lb (62.1 kg)    SpO2 97%    BMI 20.98 kg/m2         Disclaimer:  Advised her to call back or return to office if symptoms worsen/change/persist.  Discussed expected course/resolution/complications of diagnosis in detail with patient. Medication risks/benefits/costs/interactions/alternatives discussed with patient. She was given an after visit summary which includes diagnoses, current medications, & vitals. She expressed understanding with the diagnosis and plan.         Collin Qureshi MD

## 2017-06-16 NOTE — PROGRESS NOTES
Originally went to urgent care because leg was swollen. Was told she had a spider bite. Requesting order for PT for her back.

## 2017-06-16 NOTE — PATIENT INSTRUCTIONS
Learning About RICE (Rest, Ice, Compression, and Elevation)  What is RICE? RICE is a way to care for an injury. RICE helps relieve pain and swelling. It may also help with healing and flexibility. RICE stands for:  · Rest and protect the injured or sore area. · Ice or a cold pack used as soon as possible. · Compression, or wrapping the injured or sore area with an elastic bandage. · Elevation (propping up) the injured or sore area. How do you do RICE? You can use RICE for home treatment when you have general aches and pains or after an injury or surgery. Rest  · Do not put weight on the injury for at least 24 to 48 hours. · Use crutches for a badly sprained knee or ankle. · Support a sprained wrist, elbow, or shoulder with a sling. Ice  · Put ice or a cold pack on the injury right away to reduce pain and swelling. Frozen vegetables will also work as an ice pack. Put a thin cloth between the ice or cold pack and your skin. The cloth protects the injured area from getting too cold. · Use ice for 10 to 15 minutes at a time for the first 48 to 72 hours. Compression  · Use compression for sprains, strains, and surgeries of the arms and legs. · Wrap the injured area with an elastic bandage or compression sleeve to reduce swelling. · Don't wrap it too tightly. If the area below it feels numb, tingles, or feels cool, loosen the wrap. Elevation  · Use elevation for areas of the body that can be propped up, such as arms and legs. · Prop up the injured area on pillows whenever you use ice. Keep it propped up anytime you sit or lie down. · Try to keep the injured area at or above the level of your heart. This will help reduce swelling and bruising. Where can you learn more? Go to http://tanna-leonel.info/. Enter Y743 in the search box to learn more about \"Learning About RICE (Rest, Ice, Compression, and Elevation). \"  Current as of: May 23, 2016  Content Version: 11.2  © 2032-6852 [employer]. Care instructions adapted under license by [employer] (which disclaims liability or warranty for this information). If you have questions about a medical condition or this instruction, always ask your healthcare professional. [address] any warranty or liability for your use of this information.

## 2017-06-19 ENCOUNTER — TELEPHONE (OUTPATIENT)
Dept: INTERNAL MEDICINE CLINIC | Age: 82
End: 2017-06-19

## 2017-06-19 NOTE — TELEPHONE ENCOUNTER
Patient states the rash that was on her ankles when she saw Dr. Kamron Pineda on Friday is now on her neck, stomach and back. She worked in the yard for 3 hours yesterday. Asked if he would send in a prescription for something.

## 2017-06-19 NOTE — TELEPHONE ENCOUNTER
Need more clarification. She was seen for suspected spider bite (she had 2 purplish lesions). What is this new rash she is talking about.

## 2017-06-20 NOTE — TELEPHONE ENCOUNTER
Called pt back and pt states she has a bumpy rash now on the back of her neck and on her back. She stated she showed it to Dr Jacques Harrison and stated she thought it may be poison ivy and he stated no it is not and to apply lotion to it. She stated the 2 purple lesions are almost gone. She suspects she may of gotten the rash from the yard. She states now her stomach is now beginning to itch too. She states benadryl is helping some.

## 2017-06-22 RX ORDER — PREDNISONE 10 MG/1
TABLET ORAL
Qty: 30 TAB | Refills: 0 | Status: SHIPPED | OUTPATIENT
Start: 2017-06-22 | End: 2017-10-03 | Stop reason: ALTCHOICE

## 2017-09-04 DIAGNOSIS — J30.2 SEASONAL ALLERGIC RHINITIS, UNSPECIFIED ALLERGIC RHINITIS TRIGGER: ICD-10-CM

## 2017-09-04 RX ORDER — MONTELUKAST SODIUM 10 MG/1
TABLET ORAL
Qty: 90 TAB | Refills: 1 | Status: SHIPPED | OUTPATIENT
Start: 2017-09-04 | End: 2018-03-01 | Stop reason: SDUPTHER

## 2017-10-01 DIAGNOSIS — E78.00 PURE HYPERCHOLESTEROLEMIA: ICD-10-CM

## 2017-10-01 RX ORDER — SIMVASTATIN 40 MG/1
TABLET, FILM COATED ORAL
Qty: 90 TAB | Refills: 1 | Status: SHIPPED | OUTPATIENT
Start: 2017-10-01 | End: 2018-03-01 | Stop reason: SDUPTHER

## 2017-10-03 ENCOUNTER — OFFICE VISIT (OUTPATIENT)
Dept: INTERNAL MEDICINE CLINIC | Age: 82
End: 2017-10-03

## 2017-10-03 VITALS
RESPIRATION RATE: 18 BRPM | DIASTOLIC BLOOD PRESSURE: 72 MMHG | HEART RATE: 78 BPM | HEIGHT: 68 IN | BODY MASS INDEX: 21.07 KG/M2 | OXYGEN SATURATION: 98 % | TEMPERATURE: 98.1 F | SYSTOLIC BLOOD PRESSURE: 120 MMHG | WEIGHT: 139 LBS

## 2017-10-03 DIAGNOSIS — J02.9 SORE THROAT: Primary | ICD-10-CM

## 2017-10-03 DIAGNOSIS — H61.23 BILATERAL IMPACTED CERUMEN: ICD-10-CM

## 2017-10-03 NOTE — MR AVS SNAPSHOT
Visit Information Date & Time Provider Department Dept. Phone Encounter #  
 10/3/2017 11:00 AM Alyssia Carmichael, [address] [facility] [id number] Follow-up Instructions Return if symptoms worsen or fail to improve. Your Appointments 11/13/2017  2:40 PM  
Follow Up with Rayray Carrasco MD  
[facility] at [facility] [address]) Appt Note: F/U Seizure,CP,$0,adt,11/4/2016  
 [facility], 
[address], [address] [address]  
[address] [facility], [address], [address] [address]  
  
    
 3/1/2018 10:30 AM  
Medicare Physical with Alyssia Carmichael MD  
[place] [place] [address] Internal Medicine [address]) Appt Note: CPE - wellness [address] [facility] [address] [place] [address] [facility] [address]  
[facility] [address] [place] [address] Upcoming Health Maintenance Date Due INFLUENZA AGE 9 TO ADULT 8/1/2017 MEDICARE YEARLY EXAM 3/1/2018 GLAUCOMA SCREENING Q2Y 4/18/2019 DTaP/Tdap/Td series (2 - Td) 8/26/2025 Allergies as of 10/3/2017  Review Complete On: 10/3/2017 By: Alyssia Carmichael MD  
  
 Severity Noted Reaction Type Reactions Fosamax [Alendronate]  08/05/2010    Other (comments) GERD Keppra [Levetiracetam]  10/30/2014    Other (comments) \"IT DIDN'T WORK\" Lyrica [Pregabalin]  07/21/2010    Unknown (comments) Pravastatin  08/05/2010    Other (comments) Leg pain Current Immunizations  Reviewed on 10/3/2017 Name Date Influenza High Dose Vaccine PF 10/8/2016, 10/13/2015, 10/7/2014 Influenza Vaccine 11/1/2013 Influenza Vaccine Whole 9/15/2010 Pneumococcal Conjugate (PCV-13) 8/26/2015 TD Vaccine 2/14/2006 Tdap 8/26/2015 ZZZ-RETIRED (DO NOT USE) Pneumococcal Vaccine (Unspecified Type) 11/1/2005 Zoster Vaccine, Live 12/9/2009 Reviewed by Jessica Corcoran RN on 10/3/2017 at 11:17 AM  
You Were Diagnosed With   
  
 Codes Comments Sore throat    -  Primary ICD-10-CM: J02.9 ICD-9-CM: 849 Bilateral impacted cerumen     ICD-10-CM: H61.23 
ICD-9-CM: 380.4 Vitals BP Pulse Temp Resp Height(growth percentile) Weight(growth percentile) 120/72 78 98.1 °F (36.7 °C) (Oral) 18 5' 7.75\" (1.721 m) 139 lb (63 kg) SpO2 BMI OB Status Smoking Status 98% 21.29 kg/m2 Hysterectomy Former Smoker BMI and BSA Data Body Mass Index Body Surface Area  
 21.29 kg/m 2 1.74 m 2 Preferred Pharmacy Pharmacy Name Phone [facility] [address] [facility], [address] [place]  [phone] Your Updated Medication List  
  
   
This list is accurate as of: 10/3/17 11:48 AM.  Always use your most recent med list.  
  
  
  
  
 CALCIUM 500+D 500 mg(1,250mg) -200 unit per tablet Generic drug:  calcium-vitamin D Take 1 Tab by mouth two (2) times daily (with meals). CENTRUM SILVER Tab tablet Generic drug:  multivitamins-minerals-lutein Take 1 Tab by mouth daily. lacosamide 100 mg Tab tablet Commonly known as:  VIMPAT Take 1 Tab by mouth two (2) times a day. Max Daily Amount: 200 mg.  
  
 montelukast 10 mg tablet Commonly known as:  SINGULAIR  
TAKE 1 TABLET DAILY PROLIA 60 mg/mL injection Generic drug:  denosumab 60 mg by SubCUTAneous route. Every 6 months  
  
 simvastatin 40 mg tablet Commonly known as:  ZOCOR  
TAKE 1 TABLET NIGHTLY Follow-up Instructions Return if symptoms worsen or fail to improve. Patient Instructions Sore Throat: Care Instructions Your Care Instructions Infection by bacteria or a virus causes most sore throats. Cigarette smoke, dry air, air pollution, allergies, and yelling can also cause a sore throat. Sore throats can be painful and annoying.  Fortunately, most sore throats go away on their own. If you have a bacterial infection, your doctor may prescribe antibiotics. Follow-up care is a key part of your treatment and safety. Be sure to make and go to all appointments, and call your doctor if you are having problems. It's also a good idea to know your test results and keep a list of the medicines you take. How can you care for yourself at home? · If your doctor prescribed antibiotics, take them as directed. Do not stop taking them just because you feel better. You need to take the full course of antibiotics. · Gargle with warm salt water once an hour to help reduce swelling and relieve discomfort. Use 1 teaspoon of salt mixed in 1 cup of warm water. · Take an over-the-counter pain medicine, such as acetaminophen (Tylenol), ibuprofen (Advil, Motrin), or naproxen (Aleve). Read and follow all instructions on the label. · Be careful when taking over-the-counter cold or flu medicines and Tylenol at the same time. Many of these medicines have acetaminophen, which is Tylenol. Read the labels to make sure that you are not taking more than the recommended dose. Too much acetaminophen (Tylenol) can be harmful. · Drink plenty of fluids. Fluids may help soothe an irritated throat. Hot fluids, such as tea or soup, may help decrease throat pain. · Use over-the-counter throat lozenges to soothe pain. Regular cough drops or hard candy may also help. These should not be given to young children because of the risk of choking. · Do not smoke or allow others to smoke around you. If you need help quitting, talk to your doctor about stop-smoking programs and medicines. These can increase your chances of quitting for good. · Use a vaporizer or humidifier to add moisture to your bedroom. Follow the directions for cleaning the machine. When should you call for help? Call your doctor now or seek immediate medical care if: 
· You have new or worse trouble swallowing. · Your sore throat gets much worse on one side. Watch closely for changes in your health, and be sure to contact your doctor if you do not get better as expected. Where can you learn more? Go to http://tanna-leonel.info/. Enter [id number] in the search box to learn more about \"Sore Throat: Care Instructions. \" Current as of: July 29, 2016 Content Version: 11.3 © 0058-4648 [employer]. Care instructions adapted under license by [employer] (which disclaims liability or warranty for this information). If you have questions about a medical condition or this instruction, always ask your healthcare professional. [address] any warranty or liability for your use of this information. Introducing [facility]! Gwen Ugarte introduces Red [employer] patient portal. Now you can access parts of your medical record, email your doctor's office, and request medication refills online. 1. In your internet browser, go to [email or web address] 2. Click on the First Time User? Click Here link in the Sign In box. You will see the New Member Sign Up page. 3. Enter your Red [employer] Access Code exactly as it appears below. You will not need to use this code after youve completed the sign-up process. If you do not sign up before the expiration date, you must request a new code. · [employer] Access Code: [email or web address] Expires: 1/1/2018 11:48 AM 
 
4. Enter the last four digits of your Social Security Number (xxxx) and Date of Birth (mm/dd/yyyy) as indicated and click Submit. You will be taken to the next sign-up page. 5. Create a [employer] ID. This will be your Red Clay login ID and cannot be changed, so think of one that is secure and easy to remember. 6. Create a Red Clay password. You can change your password at any time. 7. Enter your Password Reset Question and Answer. This can be used at a later time if you forget your password. 8. Enter your e-mail address. You will receive e-mail notification when new information is available in [address] 9. Click Sign Up. You can now view and download portions of your medical record. 10. Click the Download Summary menu link to download a portable copy of your medical information. If you have questions, please visit the Frequently Asked Questions section of the [employer] website. Remember, [employer] is NOT to be used for urgent needs. For medical emergencies, dial 911. Now available from your iPhone and Android! Please provide this summary of care documentation to your next provider. Your primary care clinician is listed as Li Collins. If you have any questions after today's visit, please call [phone].

## 2017-10-03 NOTE — PATIENT INSTRUCTIONS
Sore Throat: Care Instructions  Your Care Instructions    Infection by bacteria or a virus causes most sore throats. Cigarette smoke, dry air, air pollution, allergies, and yelling can also cause a sore throat. Sore throats can be painful and annoying. Fortunately, most sore throats go away on their own. If you have a bacterial infection, your doctor may prescribe antibiotics. Follow-up care is a key part of your treatment and safety. Be sure to make and go to all appointments, and call your doctor if you are having problems. It's also a good idea to know your test results and keep a list of the medicines you take. How can you care for yourself at home? · If your doctor prescribed antibiotics, take them as directed. Do not stop taking them just because you feel better. You need to take the full course of antibiotics. · Gargle with warm salt water once an hour to help reduce swelling and relieve discomfort. Use 1 teaspoon of salt mixed in 1 cup of warm water. · Take an over-the-counter pain medicine, such as acetaminophen (Tylenol), ibuprofen (Advil, Motrin), or naproxen (Aleve). Read and follow all instructions on the label. · Be careful when taking over-the-counter cold or flu medicines and Tylenol at the same time. Many of these medicines have acetaminophen, which is Tylenol. Read the labels to make sure that you are not taking more than the recommended dose. Too much acetaminophen (Tylenol) can be harmful. · Drink plenty of fluids. Fluids may help soothe an irritated throat. Hot fluids, such as tea or soup, may help decrease throat pain. · Use over-the-counter throat lozenges to soothe pain. Regular cough drops or hard candy may also help. These should not be given to young children because of the risk of choking. · Do not smoke or allow others to smoke around you. If you need help quitting, talk to your doctor about stop-smoking programs and medicines.  These can increase your chances of quitting for good. · Use a vaporizer or humidifier to add moisture to your bedroom. Follow the directions for cleaning the machine. When should you call for help? Call your doctor now or seek immediate medical care if:  · You have new or worse trouble swallowing. · Your sore throat gets much worse on one side. Watch closely for changes in your health, and be sure to contact your doctor if you do not get better as expected. Where can you learn more? Go to http://tanna-leonel.info/. Enter [phone] [id number] in the search box to learn more about \"Sore Throat: Care Instructions. \"  Current as of: July 29, 2016  Content Version: 11.3  © 8938-8903 [employer]. Care instructions adapted under license by [employer] (which disclaims liability or warranty for this information). If you have questions about a medical condition or this instruction, always ask your healthcare professional. [address] any warranty or liability for your use of this information.

## 2017-10-03 NOTE — PROGRESS NOTES
Juventino Arteaga is a [de-identified] y.o. female who was seen in clinic today (10/3/2017). Assessment & Plan:  Diagnoses and all orders for this visit:    1. Sore throat- discussed diagnosis & treatment options, most likely viral at this time, reviewed the importance of avoiding unnecessary antibiotic therapy, reviewed which OTC medications to use and avoid, expected time course for resolution & red flags were reviewed with her to RTC or notify me. 2. Bilateral impacted cerumen- she will RTC for irrigation next week if no changes         Follow-up Disposition:  Return if symptoms worsen or fail to improve.       ----------------------------------------------------------------------    Subjective:  Namrata Hargrove was seen today for Cold Symptoms    URI Review  Namrata Hargrove returns to clinic today to talk about: URI symptoms for 2 days ago, which are fluctuating since that time. Started with laryngitis (this improved) and sore throat. She also reports dry cough. She denies a history of: fever, chills, sinus congestion, chest congestion and SOB/PRINGLE. Treatments have included: Nyquil, salt water rinses, Dayquil which have been somewhat effective. Relevant PMH: allergic rhinitis. Patient reports sick contacts: no.           Prior to Admission medications    Medication Sig Start Date End Date Taking? Authorizing Provider   simvastatin (ZOCOR) 40 mg tablet TAKE 1 TABLET NIGHTLY 10/1/17  Yes Dejon Rubio MD   montelukast (SINGULAIR) 10 mg tablet TAKE 1 TABLET DAILY 9/4/17  Yes Dejon Rubio MD   lacosamide (VIMPAT) 100 mg tab tablet Take 1 Tab by mouth two (2) times a day. Max Daily Amount: 200 mg. 3/1/17  Yes Merline Carter MD   multivitamins-minerals-lutein (CENTRUM SILVER) tab tablet Take 1 Tab by mouth daily. Yes Historical Provider   Denosumab (PROLIA) 60 mg/mL injection 60 mg by SubCUTAneous route.  Every 6 months   Yes Historical Provider   calcium-vitamin D (CALCIUM 500+D) 500 mg(1,250mg) -200 unit per tablet Take 1 Tab by mouth two (2) times daily (with meals). Yes Historical Provider          Allergies   Allergen Reactions    Fosamax [Alendronate] Other (comments)     GERD    Keppra [Levetiracetam] Other (comments)     \"IT DIDN'T WORK\"    Lyrica [Pregabalin] Unknown (comments)    Pravastatin Other (comments)     Leg pain           ROS - per HPI       Objective:   Physical Exam   Constitutional: No distress. HENT:   Nose: No mucosal edema or rhinorrhea. Right sinus exhibits no maxillary sinus tenderness and no frontal sinus tenderness. Left sinus exhibits no maxillary sinus tenderness and no frontal sinus tenderness. Mouth/Throat: Uvula is midline and mucous membranes are normal. Posterior oropharyngeal erythema present. No oropharyngeal exudate. Right ear is: 100% occluded with hard cerumen. Left ear is: 75% occluded with hard cerumen. Eyes: Conjunctivae are normal. No scleral icterus. Neck: Neck supple. Cardiovascular: Regular rhythm and normal heart sounds. No murmur heard. Pulmonary/Chest: Effort normal and breath sounds normal. She has no wheezes. She has no rales. Lymphadenopathy:     She has no cervical adenopathy. Visit Vitals    /72    Pulse 78    Temp 98.1 °F (36.7 °C) (Oral)    Resp 18    Ht 5' 7.75\" (1.721 m)    Wt 139 lb (63 kg)    SpO2 98%    BMI 21.29 kg/m2         Disclaimer:  Advised her to call back or return to office if symptoms worsen/change/persist.  Discussed expected course/resolution/complications of diagnosis in detail with patient. Medication risks/benefits/costs/interactions/alternatives discussed with patient. She was given an after visit summary which includes diagnoses, current medications, & vitals. She expressed understanding with the diagnosis and plan.         Forest Herring MD

## 2017-10-09 ENCOUNTER — TELEPHONE (OUTPATIENT)
Dept: NEUROLOGY | Age: 82
End: 2017-10-09

## 2017-10-09 DIAGNOSIS — I65.23 STENOSIS OF BOTH CAROTID ARTERIES WITHOUT INFARCTION: ICD-10-CM

## 2017-10-09 DIAGNOSIS — I67.89 CEREBRAL MICROVASCULAR DISEASE: ICD-10-CM

## 2017-10-09 DIAGNOSIS — G45.1 TRANSIENT ISCHEMIC ATTACK INVOLVING LEFT INTERNAL CAROTID ARTERY: ICD-10-CM

## 2017-10-09 DIAGNOSIS — G40.209 COMPLEX PARTIAL SEIZURE WITH IMPAIRMENT OF CONSCIOUSNESS (HCC): ICD-10-CM

## 2017-10-09 DIAGNOSIS — R42 DIZZINESS AND GIDDINESS: Chronic | ICD-10-CM

## 2017-10-09 RX ORDER — LACOSAMIDE 100 MG/1
100 TABLET ORAL 2 TIMES DAILY
Qty: 180 TAB | Refills: 3 | Status: SHIPPED | OUTPATIENT
Start: 2017-10-09 | End: 2017-10-10 | Stop reason: SDUPTHER

## 2017-10-09 NOTE — TELEPHONE ENCOUNTER
Received call from patient, she stated that she called with a number for the rx to be called to. She did not want the rx to be called in to [facility] due to cost. She would like the rx to be called in to [facility]: [phone], and she would like to be called back when it's done.     [phone]

## 2017-10-10 ENCOUNTER — DOCUMENTATION ONLY (OUTPATIENT)
Dept: NEUROLOGY | Age: 82
End: 2017-10-10

## 2017-10-10 RX ORDER — LACOSAMIDE 100 MG/1
100 TABLET ORAL 2 TIMES DAILY
Qty: 180 TAB | Refills: 3 | Status: SHIPPED | OUTPATIENT
Start: 2017-10-10 | End: 2018-03-28 | Stop reason: SDUPTHER

## 2017-10-10 NOTE — TELEPHONE ENCOUNTER
Spoke to patient and let them know script for vimpat will [facility]. [facility].

## 2017-10-23 DIAGNOSIS — R42 DIZZINESS AND GIDDINESS: Chronic | ICD-10-CM

## 2017-10-23 DIAGNOSIS — I65.23 STENOSIS OF BOTH CAROTID ARTERIES WITHOUT INFARCTION: ICD-10-CM

## 2017-10-23 DIAGNOSIS — I67.89 CEREBRAL MICROVASCULAR DISEASE: ICD-10-CM

## 2017-10-23 DIAGNOSIS — G40.209 COMPLEX PARTIAL SEIZURE WITH IMPAIRMENT OF CONSCIOUSNESS (HCC): ICD-10-CM

## 2017-10-23 DIAGNOSIS — G45.1 TRANSIENT ISCHEMIC ATTACK INVOLVING LEFT INTERNAL CAROTID ARTERY: ICD-10-CM

## 2017-10-23 RX ORDER — LACOSAMIDE 100 MG/1
100 TABLET ORAL 2 TIMES DAILY
Qty: 20 TAB | Refills: 0 | Status: CANCELLED | OUTPATIENT
Start: 2017-10-23

## 2017-10-23 NOTE — TELEPHONE ENCOUNTER
----- Message from Elvisneha Quezada sent at 10/23/2017  1:39 PM EDT -----  Regarding: Dr. Lucie Solorio  Pt states that she cut her Vimpat back to from 2 pills to 1 pill last week to try to make them last until she could get her mail order, but she took her last one this morning and the delivery date will make her out for 8-10 pills. She is asking if you could call in 8 or 10 pills to [facility] at [phone]. Her contact number is [phone].

## 2017-10-23 NOTE — TELEPHONE ENCOUNTER
Left message that we have samples that the patient can .   Asked for call back in case she just wants us to send to the pharmacy instead

## 2017-10-24 NOTE — TELEPHONE ENCOUNTER
I spoke with the patient and she doesn't need the samples as she got the medication yesterday from the pharmacy

## 2017-10-25 ENCOUNTER — TELEPHONE (OUTPATIENT)
Dept: NEUROLOGY | Age: 82
End: 2017-10-25

## 2017-10-25 NOTE — TELEPHONE ENCOUNTER
----- Message from Susanne Castrejon sent at 10/25/2017  8:11 AM EDT -----  Regarding: Dr. Michele Hutchins  Pt cancelled her appt. This morning and wants Dada Pacheco to call her so she can discuss why. Her contact number is [phone].

## 2017-10-27 ENCOUNTER — TELEPHONE (OUTPATIENT)
Dept: NEUROLOGY | Age: 82
End: 2017-10-27

## 2017-10-27 NOTE — TELEPHONE ENCOUNTER
Patient notified me that she had been doing the correct dosage this whole time. Just simply read it wrong.   She already has a follow up and will be seen then

## 2017-11-13 ENCOUNTER — OFFICE VISIT (OUTPATIENT)
Dept: NEUROLOGY | Age: 82
End: 2017-11-13

## 2017-11-13 VITALS
HEART RATE: 83 BPM | DIASTOLIC BLOOD PRESSURE: 72 MMHG | OXYGEN SATURATION: 99 % | WEIGHT: 138 LBS | RESPIRATION RATE: 16 BRPM | BODY MASS INDEX: 20.92 KG/M2 | HEIGHT: 68 IN | SYSTOLIC BLOOD PRESSURE: 140 MMHG

## 2017-11-13 DIAGNOSIS — R42 DIZZINESS AND GIDDINESS: Chronic | ICD-10-CM

## 2017-11-13 DIAGNOSIS — G45.1 TRANSIENT ISCHEMIC ATTACK INVOLVING LEFT INTERNAL CAROTID ARTERY: ICD-10-CM

## 2017-11-13 DIAGNOSIS — G40.209 COMPLEX PARTIAL SEIZURE WITH IMPAIRMENT OF CONSCIOUSNESS (HCC): Primary | ICD-10-CM

## 2017-11-13 DIAGNOSIS — I65.23 STENOSIS OF BOTH CAROTID ARTERIES WITHOUT INFARCTION: ICD-10-CM

## 2017-11-13 DIAGNOSIS — I67.89 CEREBRAL MICROVASCULAR DISEASE: ICD-10-CM

## 2017-11-13 NOTE — PATIENT INSTRUCTIONS

## 2017-11-13 NOTE — LETTER
11/13/2017 8:31 PM 
 
Patient:  Kamilah Arteaga YOB: 1935 Date of Visit: 11/13/2017 Dear No Recipients: Thank you for referring Ms. Kamilah Arteaga to me for evaluation/treatment. Below are the relevant portions of my assessment and plan of care. Consult Subjective:  
 
Kamilah Arteaga is a [de-identified] y. o.right-handed  female seen today for evaluation of new complaint of 2 new breakthrough seizures that occurred September 2017 2 months ago without reciprocating causes, the request of Dr. Jc Sullivan. She had 2 spells, manifest by her suddenly feeling funny, and putting her head down briefly for a few seconds, then coming out of it. She was playing cards one time with her friends, and they thought she was a little confused temporarily, but had no other tonic-clonic activity or other seizure activity. She most likely had a partial complex seizure. They are very brief and last 5 seconds only. She does not want to increase her medication, and she is currently on Vimpat 100 mg twice a day. Her last level was 10.1 a year ago. That is the upper limits of normal.  She has had no side effects on the medication, and is tolerating it well otherwise, and still able to get it from her mail order pharmacy at a reasonable price. She denied missing any doses of medications or any other precipitating factors for this breakthrough seizure. This was the first seizure she has had in the last 6 months. She had a 24 hour EEG spring 2016 that showed right temporal spikes but no recorded seizures. We discussed increasing her medication but she would rather wait and see how things go. She's had no falls, no new numbness or weakness in her arms and legs, no headache, no visual problems, no other bulbar symptoms, or neck pain or back pain. She does try to exercise regularly, and does take vitamins. Her C34 and folic acid levels were normal in 2012.  She has no signs of neuropathy. She actually walks fairly well and has only a very mild difficulty with tandem walking, but has a negative Romberg. She had EEGs in the past that showed a left temporal abnormality. She  Is thought to have complex partial seizures. 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 Her Dopplers done 1 year ago have not shown significant disease. She will continue a baby aspirin for her microvascular disease, but she has had no strokelike symptoms A complete review of systems in symptoms was negative for any other new medical problems, complications or illnesses. She has a past history of goiter, allergies, osteoporosis, SVT, DJD, GERD, reduced hearing, hyperlipidemia, hysterectomy, detached retina repair, colonoscopy, rectocele surgery esophageal dilatation and lip surgery for squamous cell cancer. Past Medical History:  
Diagnosis Date  Allergic rhinitis  Arthritis  Combined forms of age-related cataract of left eye 8/26/2016 [facility] I[facility] OS  
 Combined forms of age-related cataract of right eye 8/26/2016 KPE IOL OD  
 GERD (gastroesophageal reflux disease)  Goiter   
 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   
 last seizure 2015  SVT (supraventricular tachycardia) ([address]   
 as of 10/14/14 pt unaware she has ever had this and does not see a cardiologist  
  
Past Surgical History:  
Procedure Laterality Date  HX CATARACT REMOVAL Right 08/31/2016  HX CATARACT REMOVAL Left 09/14/2016  HX COLONOSCOPY  10/7/2010  HX ENDOSCOPY  10/7/2010  HX HYSTERECTOMY  1970's NIDIA 1974 - ovaries in,vaginal repair [facility] PROCEDURES  2012 7/12- bx of right lower lip, 10/12- s/p mohs for SCC  HX RETINAL DETACHMENT REPAIR Left detached retina, with laser repair  REPAIR OF RECTOCELE    
 surgery for rectocele and prolapse 8/06 - Dr Shea Espinoza Family History Problem Relation Age of Onset  Heart Disease Mother  Cancer Father   
  lung  Cancer Sister   
  colon  No Known Problems Son  No Known Problems Daughter  Anesth Problems Neg Hx Social History Substance Use Topics  Smoking status: Former Smoker Packs/day: 0.50 Years: 20.00 Types: Cigarettes Quit date: 10/30/1969  Smokeless tobacco: Never Used  Alcohol use No  
   
Current Outpatient Prescriptions Medication Sig Dispense Refill  lacosamide (VIMPAT) 100 mg tab tablet Take 1 Tab by mouth two (2) times a day. Max Daily Amount: 200 mg. 180 Tab 3  
 simvastatin (ZOCOR) 40 mg tablet TAKE 1 TABLET NIGHTLY 90 Tab 1  
 montelukast (SINGULAIR) 10 mg tablet TAKE 1 TABLET DAILY 90 Tab 1  
 multivitamins-minerals-lutein (CENTRUM SILVER) tab tablet Take 1 Tab by mouth daily.  Denosumab (PROLIA) 60 mg/mL injection 60 mg by SubCUTAneous route. Every 6 months  calcium-vitamin D (CALCIUM 500+D) 500 mg(1,250mg) -200 unit per tablet Take 1 Tab by mouth two (2) times daily (with meals). Allergies Allergen Reactions  Fosamax [Alendronate] Other (comments) GERD  Keppra [Levetiracetam] Other (comments) \"IT DIDN'T WORK\"  Lyrica [Pregabalin] Unknown (comments)  Pravastatin Other (comments) Leg pain Review of Systems: A comprehensive review of systems was negative except for: Ears, nose, mouth, throat, and face: positive for hearing loss and sore mouth Musculoskeletal: positive for myalgias, arthralgias and stiff joints Neurological: positive for dizziness, seizures, coordination problems and gait problems Objective: I 
 
 
NEUROLOGICAL EXAM: 
 
Appearance: The patient is well developed, well nourished, provides a coherent history and is in no acute distress. Mental Status: Oriented to time, place and person, and the president, fund of knowledge and cognitive function seems normal, speech is without aphasia or dysarthria. Mood and affect appropriate. Cranial Nerves:   Intact visual fields. Fundi are benign. JUSTIN, EOM's full, no nystagmus, no ptosis. Facial sensation is normal. Corneal reflexes are not tested. Facial movement is symmetric. Hearing is reduced bilaterally. Palate is midline with normal sternocleidomastoid and trapezius muscles are normal. Tongue is midline. Neck without meningismus or bruits Temporal arteries are not tender or enlarged Motor:  5/5 strength in upper and lower proximal and distal muscles. Normal bulk and tone. No fasciculations. Reflexes:   Deep tendon reflexes 2+/4 and symmetrical. No Babinski or clonus present Sensory:   Normal to touch, pinprick and DSS, and vibration mildly decreased in both feet. Gait:  Normal gait. Tremor:   No tremor noted. Cerebellar:  No cerebellar signs present. The patient has slight difficulty doing tandem walking Neurovascular:  Normal heart sounds and regular rhythm, peripheral pulses mildly reduced in both feet, and no carotid bruits. Assessment:  
 
Plan:  
 
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 Her medications were just renewed so they were not renewed today. Gait disorder most likely secondary to mild senile gait apraxia Her Doppler last year showed no significant disease Patient does not want to increase her medications because she had side effects at higher doses in the past 
Patient has relatively prominent microvascular disease on MRI but negative Dopplers and she will continue her aspirin Medication refills sent in 1 month ago Patient to continue taking her vitamins and vitamin D every day, start exercising more and see if she improves. Patient is to continue Vimpat 100 mg b.i.d. Followup in 6 months time or earlier as needed, and patient to call if any problem, and to get her test results Signed By: Guanakito Granado MD   
 November 13, 2017 This note will not be viewable in [address] If you have questions, please do not hesitate to call me. I look forward to following Ms. Nolan Zhong along with you. Sincerely, Guanakito Granado MD

## 2017-11-13 NOTE — MR AVS SNAPSHOT
Visit Information Date & Time Provider Department Dept. Phone Encounter #  
 11/13/2017  2:40 PM Ashley Cartwright MD [facility]rology [facility] at [facility] [phone] [id number] Follow-up Instructions Return in about 1 year (around 11/13/2018). Your Appointments 11/13/2017  2:40 PM  
Follow Up with Ashley Cartwright MD  
Neurology Clinic at [facility] [address]) Appt Note: F/U Seizure,CP,$0,adt,11/4/2016  
 [address], 
[address], [address] [id number]  
[address], [address], [address] [address]  
  
    
 3/1/2018 10:30 AM  
Medicare Physical with Renee Banuelos MD  
[facility] [address]) Appt Note: CPE - wellness [address] [facility] [facility] access hospital [id number]  
[address] [facility] [address] Upcoming Health Maintenance Date Due  
 MEDICARE YEARLY EXAM 3/1/2018 GLAUCOMA SCREENING Q2Y 4/18/2019 DTaP/Tdap/Td series (2 - Td) 8/26/2025 Allergies as of 11/13/2017  Review Complete On: 11/13/2017 By: Ashley Cartwright MD  
  
 Severity Noted Reaction Type Reactions Fosamax [Alendronate]  08/05/2010    Other (comments) GERD Keppra [Levetiracetam]  10/30/2014    Other (comments) \"IT DIDN'T WORK\" Lyrica [Pregabalin]  07/21/2010    Unknown (comments) Pravastatin  08/05/2010    Other (comments) Leg pain Current Immunizations  Reviewed on 10/3/2017 Name Date Influenza High Dose Vaccine PF 10/9/2017, 10/8/2016, 10/13/2015, 10/7/2014 Influenza Vaccine 11/1/2013 Influenza Vaccine Whole 9/15/2010 Pneumococcal Conjugate (PCV-13) 8/26/2015 TD Vaccine 2/14/2006 Tdap 8/26/2015 ZZZ-RETIRED (DO NOT USE) Pneumococcal Vaccine (Unspecified Type) 11/1/2005 Zoster Vaccine, Live 12/9/2009 Not reviewed this visit You Were Diagnosed With   
  
 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  
 140/72 83 16 5' 7.75\" (1.721 m) 138 lb (62.6 kg) 99% BMI OB Status Smoking Status 21.14 kg/m2 Hysterectomy Former Smoker Vitals History BMI and BSA Data Body Mass Index Body Surface Area  
 21.14 kg/m 2 1.73 m 2 Preferred Pharmacy Pharmacy Name Phone [address] [facility] [phone] Your Updated Medication List  
  
   
This list is accurate as of: 11/13/17  2:16 PM.  Always use your most recent med list.  
  
  
  
  
 CALCIUM 500+D 500 mg(1,250mg) -200 unit per tablet Generic drug:  calcium-vitamin D Take 1 Tab by mouth two (2) times daily (with meals). CENTRUM SILVER Tab tablet Generic drug:  multivitamins-minerals-lutein Take 1 Tab by mouth daily. lacosamide 100 mg Tab tablet Commonly known as:  VIMPAT Take 1 Tab by mouth two (2) times a day. Max Daily Amount: 200 mg.  
  
 montelukast 10 mg tablet Commonly known as:  SINGULAIR  
TAKE 1 TABLET DAILY PROLIA 60 mg/mL injection Generic drug:  denosumab 60 mg by SubCUTAneous route. Every 6 months  
  
 simvastatin 40 mg tablet Commonly known as:  ZOCOR  
TAKE 1 TABLET NIGHTLY We Performed the Following LACOSAMIDE [97000 CPT(R)] Follow-up Instructions Return in about 1 year (around 11/13/2018). Patient Instructions A Healthy Lifestyle: Care Instructions Your Care Instructions A healthy lifestyle can help you feel good, stay at a healthy weight, and have plenty of energy for both work and play. A healthy lifestyle is something you can share with your whole family. A healthy lifestyle also can lower your risk for serious health problems, such as high blood pressure, heart disease, and diabetes. You can follow a few steps listed below to improve your health and the health of your family. Follow-up care is a key part of your treatment and safety. Be sure to make and go to all appointments, and call your doctor if you are having problems. It's also a good idea to know your test results and keep a list of the medicines you take. How can you care for yourself at home? · Do not eat too much sugar, fat, or fast foods. You can still have dessert and treats now and then. The goal is moderation. · Start small to improve your eating habits. Pay attention to portion sizes, drink less juice and soda pop, and eat more fruits and vegetables. ¨ Eat a healthy amount of food. A 3-ounce serving of meat, for example, is about the size of a deck of cards. Fill the rest of your plate with vegetables and whole grains. ¨ Limit the amount of soda and sports drinks you have every day. Drink more water when you are thirsty. ¨ Eat at least 5 servings of fruits and vegetables every day. It may seem like a lot, but it is not hard to reach this goal. A serving or helping is 1 piece of fruit, 1 cup of vegetables, or 2 cups of leafy, raw vegetables. Have an apple or some carrot sticks as an afternoon snack instead of a candy bar. Try to have fruits and/or vegetables at every meal. 
· Make exercise part of your daily routine. You may want to start with simple activities, such as walking, bicycling, or slow swimming. Try to be active 30 to 60 minutes every day.  You do not need to do all 30 to 60 minutes all at once. For example, you can exercise 3 times a day for 10 or 20 minutes. Moderate exercise is safe for most people, but it is always a good idea to talk to your doctor before starting an exercise program. 
· Keep moving. Neha Cooper the lawn, work in the garden, or [employer]. Take the stairs instead of the elevator at work. · If you smoke, quit. People who smoke have an increased risk for heart attack, stroke, cancer, and other lung illnesses. Quitting is hard, but there are ways to boost your chance of quitting tobacco for good. ¨ Use nicotine gum, patches, or lozenges. ¨ Ask your doctor about stop-smoking programs and medicines. ¨ Keep trying. In addition to reducing your risk of diseases in the future, you will notice some benefits soon after you stop using tobacco. If you have shortness of breath or asthma symptoms, they will likely get better within a few weeks after you quit. · Limit how much alcohol you drink. Moderate amounts of alcohol (up to 2 drinks a day for men, 1 drink a day for women) are okay. But drinking too much can lead to liver problems, high blood pressure, and other health problems. Family health If you have a family, there are many things you can do together to improve your health. · Eat meals together as a family as often as possible. · Eat healthy foods. This includes fruits, vegetables, lean meats and dairy, and whole grains. · Include your family in your fitness plan. Most people think of activities such as jogging or tennis as the way to fitness, but there are many ways you and your family can be more active. Anything that makes you breathe hard and gets your heart pumping is exercise. Here are some tips: 
¨ Walk to do errands or to take your child to school or the bus. ¨ Go for a family bike ride after dinner instead of watching TV. Where can you learn more? Go to http://tanna-leonel.info/. Enter T917 in the search box to learn more about \"A Healthy Lifestyle: Care Instructions. \" Current as of: May 12, 2017 Content Version: 11.4 © 1654-6852 [employer]wise[employer]. Care instructions adapted under license by [employer] (which disclaims liability or warranty for this information). If you have questions about a medical condition or this instruction, always ask your healthcare professional. [address] any warranty or liability for your use of this information. Introducing [facility]! Saskia Gonzalez introduces Amadesa patient portal. Now you can access parts of your medical record, email your doctor's office, and request medication refills online. 1. In your internet browser, go to [email or web address] 2. Click on the First Time User? Click Here link in the Sign In box. You will see the New Member Sign Up page. 3. Enter your Am[employer] Access Code exactly as it appears below. You will not need to use this code after youve completed the sign-up process. If you do not sign up before the expiration date, you must request a new code. · [employer] Access Code: [email or web address] Expires: 1/1/2018 10:48 AM 
 
4. Enter the last four digits of your Social Security Number (xxxx) and Date of Birth (mm/dd/yyyy) as indicated and click Submit. You will be taken to the next sign-up page. 5. Create a Amadesa ID. This will be your [employer] login ID and cannot be changed, so think of one that is secure and easy to remember. 6. Create a [employer] password. You can change your password at any time. 7. Enter your Password Reset Question and Answer. This can be used at a later time if you forget your password. 8. Enter your e-mail address. You will receive e-mail notification when new information is available in [address] 9. Click Sign Up. You can now view and download portions of your medical record. 10. Click the Download Summary menu link to download a portable copy of your medical information. If you have questions, please visit the Frequently Asked Questions section of the [employer] website. Remember, [employer] is NOT to be used for urgent needs. For medical emergencies, dial 911. Now available from your iPhone and Android! Please provide this summary of care documentation to your next provider. Your primary care clinician is listed as Jesse Joseph. If you have any questions after today's visit, please call [phone].

## 2017-11-14 NOTE — PROGRESS NOTES
Consult    Subjective:     Juventino Arteaga is a [de-identified] y. o.right-handed  female seen today for evaluation of new complaint of 2 new breakthrough seizures that occurred September 2017 2 months ago without reciprocating causes, the request of Dr. Tomasa Halsted. She had 2 spells, manifest by her suddenly feeling funny, and putting her head down briefly for a few seconds, then coming out of it. She was playing cards one time with her friends, and they thought she was a little confused temporarily, but had no other tonic-clonic activity or other seizure activity. She most likely had a partial complex seizure. They are very brief and last 5 seconds only. She does not want to increase her medication, and she is currently on Vimpat 100 mg twice a day. Her last level was 10.1 a year ago. That is the upper limits of normal.  She has had no side effects on the medication, and is tolerating it well otherwise, and still able to get it from her mail order pharmacy at a reasonable price. She denied missing any doses of medications or any other precipitating factors for this breakthrough seizure. This was the first seizure she has had in the last 6 months. She had a 24 hour EEG spring 2016 that showed right temporal spikes but no recorded seizures. We discussed increasing her medication but she would rather wait and see how things go. She's had no falls, no new numbness or weakness in her arms and legs, no headache, no visual problems, no other bulbar symptoms, or neck pain or back pain. She does try to exercise regularly, and does take vitamins. Her I60 and folic acid levels were normal in 2012. She has no signs of neuropathy. She actually walks fairly well and has only a very mild difficulty with tandem walking, but has a negative Romberg. She had EEGs in the past that showed a left temporal abnormality. She  Is thought to have complex partial seizures.  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 Her Dopplers done 1 year ago have not shown significant disease. She will continue a baby aspirin for her microvascular disease, but she has had no strokelike symptoms    A complete review of systems in symptoms was negative for any other new medical problems, complications or illnesses. She has a past history of goiter, allergies, osteoporosis, SVT, DJD, GERD, reduced hearing, hyperlipidemia, hysterectomy, detached retina repair, colonoscopy, rectocele surgery esophageal dilatation and lip surgery for squamous cell cancer.     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    Right lower lip, s/p moh's surgery (10/30/12)     Seizures ([address]     last seizure 2015    SVT (supraventricular tachycardia) ([address]     as of 10/14/14 pt unaware she has ever had this and does not see a cardiologist      Past Surgical History:   Procedure Laterality Date    HX CATARACT REMOVAL Right 08/31/2016    HX CATARACT REMOVAL Left 09/14/2016    HX COLONOSCOPY  10/7/2010         HX ENDOSCOPY  10/7/2010         HX HYSTERECTOMY  1970's    NIDIA 1974 - ovaries in,vaginal repair    HX MOHS PROCEDURES  2012 7/12- bx of right lower lip, 10/12- s/p mohs for SCC    HX RETINAL DETACHMENT REPAIR Left     detached retina, with laser repair    REPAIR OF RECTOCELE      surgery for rectocele and prolapse 8/06 - Dr Shivam Bueno     Family History   Problem Relation Age of Onset    Heart Disease Mother     Cancer Father      lung    Cancer Sister      colon    No Known Problems Son     No Known Problems Daughter    Carlton Couch Problems Neg Hx       Social History   Substance Use Topics    Smoking status: Former Smoker     Packs/day: 0.50     Years: 20.00     Types: Cigarettes     Quit date: 10/30/1969    Smokeless tobacco: Never Used    Alcohol use No       Current Outpatient Prescriptions   Medication Sig Dispense Refill    lacosamide (VIMPAT) 100 mg tab tablet Take 1 Tab by mouth two (2) times a day. Max Daily Amount: 200 mg. 180 Tab 3    simvastatin (ZOCOR) 40 mg tablet TAKE 1 TABLET NIGHTLY 90 Tab 1    montelukast (SINGULAIR) 10 mg tablet TAKE 1 TABLET DAILY 90 Tab 1    multivitamins-minerals-lutein (CENTRUM SILVER) tab tablet Take 1 Tab by mouth daily.  Denosumab (PROLIA) 60 mg/mL injection 60 mg by SubCUTAneous route. Every 6 months      calcium-vitamin D (CALCIUM 500+D) 500 mg(1,250mg) -200 unit per tablet Take 1 Tab by mouth two (2) times daily (with meals). Allergies   Allergen Reactions    Fosamax [Alendronate] Other (comments)     GERD    Keppra [Levetiracetam] Other (comments)     \"IT DIDN'T WORK\"    Lyrica [Pregabalin] Unknown (comments)    Pravastatin Other (comments)     Leg pain        Review of Systems:  A comprehensive review of systems was negative except for: Ears, nose, mouth, throat, and face: positive for hearing loss and sore mouth  Musculoskeletal: positive for myalgias, arthralgias and stiff joints  Neurological: positive for dizziness, seizures, coordination problems and gait problems     Objective:     I      NEUROLOGICAL EXAM:    Appearance: The patient is well developed, well nourished, provides a coherent history and is in no acute distress. Mental Status: Oriented to time, place and person, and the president, fund of knowledge and cognitive function seems normal, speech is without aphasia or dysarthria. Mood and affect appropriate. Cranial Nerves:   Intact visual fields. Fundi are benign. JUSTIN, EOM's full, no nystagmus, no ptosis.  Facial sensation is normal. Corneal reflexes are not tested. Facial movement is symmetric. Hearing is reduced bilaterally. Palate is midline with normal sternocleidomastoid and trapezius muscles are normal. Tongue is midline. Neck without meningismus or bruits  Temporal arteries are not tender or enlarged   Motor:  5/5 strength in upper and lower proximal and distal muscles. Normal bulk and tone. No fasciculations. Reflexes:   Deep tendon reflexes 2+/4 and symmetrical. No Babinski or clonus present   Sensory:   Normal to touch, pinprick and DSS, and vibration mildly decreased in both feet. Gait:  Normal gait. Tremor:   No tremor noted. Cerebellar:  No cerebellar signs present. The patient has slight difficulty doing tandem walking   Neurovascular:  Normal heart sounds and regular rhythm, peripheral pulses mildly reduced in both feet, and no carotid bruits. 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 Her medications were just renewed so they were not renewed today. Gait disorder most likely secondary to mild senile gait apraxia  Her Doppler last year showed no significant disease  Patient does not want to increase her medications because she had side effects at higher doses in the past  Patient has relatively prominent microvascular disease on MRI but negative Dopplers and she will continue her aspirin  Medication refills sent in 1 month ago  Patient to continue taking her vitamins and vitamin D every day, start exercising more and see if she improves. Patient is to continue Vimpat 100 mg b.i.d.    Followup in 6 months time or earlier as needed, and patient to call if any problem, and to get her test results    Signed By: Ashley Cartwright MD     November 13, 2017       This note will not be viewable in MyChart.

## 2017-11-16 LAB — LACOSAMIDE SERPL-MCNC: 7.5 UG/ML (ref 5–10)

## 2017-12-28 ENCOUNTER — TELEPHONE (OUTPATIENT)
Dept: NEUROLOGY | Age: 82
End: 2017-12-28

## 2017-12-28 NOTE — TELEPHONE ENCOUNTER
Received call from pt's , he stated that he received something from Keli Robertson stating that a blood test was done on the patient that Medicare has deemed unnecessary, and that it needed to be re-coded. Please call the patient or her  with questions.       [phone]

## 2018-01-02 ENCOUNTER — TELEPHONE (OUTPATIENT)
Dept: NEUROLOGY | Age: 83
End: 2018-01-02

## 2018-01-02 NOTE — TELEPHONE ENCOUNTER
I spoke with the  for our office and it looks as though the claim was paid by both [facility] and medicare.  Once patient calls back, I will get more information if needed

## 2018-01-02 NOTE — TELEPHONE ENCOUNTER
----- Message from Li July sent at 1/2/2018 12:05 PM EST -----  Regarding: Dr Dickerson/telephone  Pt's p) [phone], pt would like a return call back to confirm if she had any labs on the DOS of 11/.13/17, she was billed for that and does not   Remember having a blood test, and was it done it the office also [address] [place] [address] stands for . And her   Was told by Binh  that  Her 11/13/17 visit was coded wrong and that they would not pay it. And when her  spoke to someone on 12/28/17, that someone would call them back.

## 2018-01-02 NOTE — TELEPHONE ENCOUNTER
Patient had labs on that date for her medication level. Left message of this.   Sent message to  to see if the others have been paid

## 2018-01-02 NOTE — TELEPHONE ENCOUNTER
States that they were told that the code is not allowed with the lacosamide that was ordered.   They state that the code provided was G04.80  This is not even in the requisition for the patient's lab    Will need to call [facility] to get the codes right

## 2018-01-04 NOTE — TELEPHONE ENCOUNTER
[facility]   In processing to  now per [facility] billing. I added Z79.899 for other long term (current) therapy as per ok by Dr Katrin Jewell.  Will see if that helps    Added and will contact patient to have them call if they have further problems    Can you please sign off on and add Z799.899 to problem list

## 2018-01-05 ENCOUNTER — DOCUMENTATION ONLY (OUTPATIENT)
Dept: NEUROLOGY | Age: 83
End: 2018-01-05

## 2018-01-05 PROBLEM — Z79.899 ENCOUNTER FOR LONG-TERM (CURRENT) USE OF MEDICATIONS: Status: ACTIVE | Noted: 2018-01-05

## 2018-01-05 PROBLEM — Z79.899 DRUG THERAPY: Status: ACTIVE | Noted: 2018-01-05

## 2018-02-02 ENCOUNTER — TELEPHONE (OUTPATIENT)
Dept: NEUROLOGY | Age: 83
End: 2018-02-02

## 2018-02-02 NOTE — TELEPHONE ENCOUNTER
I faxed a new code to Raul Purvisemia[facility] on 1/2/18 and should have covered the lab. Now I wonder if it is a deductible problem.    I left a detailed message for them to call [facility] to get the billing details for me and call me back so that I have more information for the patient

## 2018-02-02 NOTE — TELEPHONE ENCOUNTER
----- Message from Ida Steward sent at 2/2/2018 12:34 PM EST -----  Regarding: Dr Dickerson/telephone  Pt's  (p) [phone], following up on message left with the nurse regarding correcting   the code and re submit the bill, so his wife DOS on 11/13/17, can be covered.

## 2018-02-05 ENCOUNTER — TELEPHONE (OUTPATIENT)
Dept: NEUROLOGY | Age: 83
End: 2018-02-05

## 2018-02-05 NOTE — TELEPHONE ENCOUNTER
----- Message from Domonique Palm sent at 2/5/2018 12:25 PM EST -----  Regarding: Dr Dickerson/telephone  Pt's  Celio Lara (p) [phone], would like a return call back from the nurse regarding him speaking with [facility], he spoke with them and he has some information for her.   Her DOS was 11/13/17 for $250 that was the bill submitted to [facility]

## 2018-02-07 NOTE — TELEPHONE ENCOUNTER
I spoke with the patient's  and he could not give me any other information except that something was billed wrong. Looking at previous messages on 1/4/18, Jigar Ramos had G04.80 code on this requisition which was wrong and not even in the patient's chart. Was stated that there was an udpate on this patient and was in processing. I added Z79.899 along with correct code of G40.209. Tried to call , but they are secondary and medicare denied as the claim before was done on wrong code.  Tried to call medicare, but will have to come from [facility] and not our office as they billed

## 2018-03-01 ENCOUNTER — TELEPHONE (OUTPATIENT)
Dept: INTERNAL MEDICINE CLINIC | Age: 83
End: 2018-03-01

## 2018-03-01 ENCOUNTER — OFFICE VISIT (OUTPATIENT)
Dept: INTERNAL MEDICINE CLINIC | Age: 83
End: 2018-03-01

## 2018-03-01 VITALS
BODY MASS INDEX: 20.92 KG/M2 | TEMPERATURE: 97.8 F | OXYGEN SATURATION: 98 % | SYSTOLIC BLOOD PRESSURE: 142 MMHG | HEART RATE: 78 BPM | DIASTOLIC BLOOD PRESSURE: 78 MMHG | HEIGHT: 68 IN | WEIGHT: 138 LBS | RESPIRATION RATE: 16 BRPM

## 2018-03-01 DIAGNOSIS — Z71.89 ACP (ADVANCE CARE PLANNING): ICD-10-CM

## 2018-03-01 DIAGNOSIS — Z00.00 MEDICARE ANNUAL WELLNESS VISIT, SUBSEQUENT: Primary | ICD-10-CM

## 2018-03-01 DIAGNOSIS — J30.2 CHRONIC SEASONAL ALLERGIC RHINITIS, UNSPECIFIED TRIGGER: ICD-10-CM

## 2018-03-01 DIAGNOSIS — M81.0 AGE-RELATED OSTEOPOROSIS WITHOUT CURRENT PATHOLOGICAL FRACTURE: ICD-10-CM

## 2018-03-01 DIAGNOSIS — K21.9 GASTROESOPHAGEAL REFLUX DISEASE WITHOUT ESOPHAGITIS: ICD-10-CM

## 2018-03-01 DIAGNOSIS — Z23 ENCOUNTER FOR IMMUNIZATION: ICD-10-CM

## 2018-03-01 DIAGNOSIS — G40.209 COMPLEX PARTIAL SEIZURE WITH IMPAIRMENT OF CONSCIOUSNESS (HCC): ICD-10-CM

## 2018-03-01 DIAGNOSIS — Z13.39 SCREENING FOR ALCOHOLISM: ICD-10-CM

## 2018-03-01 DIAGNOSIS — E78.00 PURE HYPERCHOLESTEROLEMIA: ICD-10-CM

## 2018-03-01 DIAGNOSIS — E04.2 MULTIPLE THYROID NODULES: ICD-10-CM

## 2018-03-01 PROBLEM — Z79.899 DRUG THERAPY: Status: RESOLVED | Noted: 2018-01-05 | Resolved: 2018-03-01

## 2018-03-01 PROBLEM — Z79.899 ENCOUNTER FOR LONG-TERM (CURRENT) USE OF MEDICATIONS: Status: RESOLVED | Noted: 2018-01-05 | Resolved: 2018-03-01

## 2018-03-01 RX ORDER — OMEPRAZOLE 40 MG/1
40 CAPSULE, DELAYED RELEASE ORAL DAILY
Qty: 90 CAP | Refills: 1 | Status: SHIPPED | OUTPATIENT
Start: 2018-03-01 | End: 2018-05-08

## 2018-03-01 RX ORDER — MONTELUKAST SODIUM 10 MG/1
TABLET ORAL
Qty: 90 TAB | Refills: 1 | Status: SHIPPED | OUTPATIENT
Start: 2018-03-01 | End: 2018-03-03 | Stop reason: SDUPTHER

## 2018-03-01 RX ORDER — SIMVASTATIN 40 MG/1
TABLET, FILM COATED ORAL
Qty: 90 TAB | Refills: 1 | Status: SHIPPED | OUTPATIENT
Start: 2018-03-01 | End: 2018-11-05 | Stop reason: SDUPTHER

## 2018-03-01 RX ORDER — OMEPRAZOLE 40 MG/1
40 CAPSULE, DELAYED RELEASE ORAL DAILY
COMMUNITY
End: 2018-03-01 | Stop reason: SDUPTHER

## 2018-03-01 NOTE — ACP (ADVANCE CARE PLANNING)
Advance Care Planning (ACP) Provider Note - Comprehensive     Date of ACP Conversation: 03/01/18  Persons included in Conversation:  patient  Length of ACP Conversation in minutes: <16 minutes (Non-Billable)    Authorized Decision Maker (if patient is incapable of making informed decisions): This person is: Healthcare Agent/Medical Power of  under Advance Directive          General ACP for ALL Patients with Decision Making Capacity:  Importance of advance care planning, including choosing a healthcare agent to communicate patient's healthcare decisions if patient lost the ability to make decisions, such as after a sudden illness or accident  Understanding of the healthcare agent role was assessed and information provided  Opportunity offered to explore how cultural, Moravian, spiritual, or personal beliefs would affect decisions for future care  Exploration of values, goals, and preferences if recovery is not expected, even with continued medical treatment in the event of: Imminent death or severe, permanent brain injury    For Serious or Chronic Illness:  Understanding of CPR, goals and expected outcomes, benefits and burdens discussed. Understanding of medical condition  Information on CPR success rates provided (e.g. for CPR in hospital, survival to d/c at two weeks is 22%, for chronically ill or elderly/frail survival is less than 3%)    Interventions Provided:  Recommended communicating the plan and making copies for the healthcare agent, personal physician, and others as appropriate (e.g., health system)  Recommended review of completed ACP document annually or upon change in health status       She has an advanced directive - a copy HAS NOT been provided. Reviewed DNR/DNI and patient is interested in remaining a DNR, no changes made.

## 2018-03-01 NOTE — PATIENT INSTRUCTIONS
Medicare Wellness Visit, Female    The best way to live healthy is to have a healthy lifestyle by eating a well-balanced diet, exercising regularly, limiting alcohol and stopping smoking. Regular physical exams and screening tests are another way to keep healthy. Preventive exams provided by your health care provider can find health problems before they become diseases or illnesses. Preventive services including immunizations, screening tests, monitoring and exams can help you take care of your own health. All people over age 72 should have a pneumovax  and and a prevnar shot to prevent pneumonia. These are once in a lifetime unless you and your provider decide differently. All people over 65 should have a yearly flu shot and a tetanus vaccine every 10 years. A bone mass density to screen for osteoporosis or thinning of the bones should be done every 2 years after 65. Screening for diabetes mellitus with a blood sugar test should be done every year. Glaucoma is a disease of the eye due to increased ocular pressure that can lead to blindness and it should be done every year by an eye professional.    Cardiovascular screening tests that check for elevated lipids (fatty part of blood) which can lead to heart disease and strokes should be done every 5 years. Colorectal screening that evaluates for blood or polyps in your colon should be done yearly as a stool test or every five years as a flexible sigmoidoscope or every 10 years as a colonoscopy up to age 76. Breast cancer screening with a mammogram is recommended biennially  for women age 54-69. Screening for cervical cancer with a pap smear and pelvic exam is recommended for women after age 72 years every 2 years up to age 79 or when the provider and patient decide to stop. If there is a history of cervical abnormalities or other increased risk for cancer then the test is recommended yearly.     Hepatitis C screening is also recommended for anyone born between [birth year] through [address]. A shingles vaccine is also recommended once in a lifetime after age 61. Your Medicare Wellness Exam is recommended annually. Here is a list of your current Health Maintenance items with a due date:  Health Maintenance Due   Topic Date Due    Annual Well Visit  03/01/2018            Learning About Niranjan Bentley  What is a living will? A living will is a legal form you use to write down the kind of care you want at the end of your life. It is used by the health professionals who will treat you if you aren't able to decide for yourself. If you put your wishes in writing, your loved ones and others will know what kind of care you want. They won't need to guess. This can ease your mind and be helpful to others. A living will is not the same as an estate or property will. An estate will explains what you want to happen with your money and property after you die. Is a living will a legal document? A living will is a legal document. Each state has its own laws about living min. If you move to another state, make sure that your living will is legal in the state where you now live. Or you might use a universal form that has been approved by many states. This kind of form can sometimes be completed and stored online. Your electronic copy will then be available wherever you have a connection to the Internet. In most cases, doctors will respect your wishes even if you have a form from a different state. · You don't need an  to complete a living will. But legal advice can be helpful if your state's laws are unclear, your health history is complicated, or your family can't agree on what should be in your living will. · You can change your living will at any time. Some people find that their wishes about end-of-life care change as their health changes. · In addition to making a living will, think about completing a medical power of  form.  This form lets you name the person you want to make end-of-life treatment decisions for you (your \"health care agent\") if you're not able to. Many hospitals and nursing homes will give you the forms you need to complete a living will and a medical power of . · Your living will is used only if you can't make or communicate decisions for yourself anymore. If you become able to make decisions again, you can accept or refuse any treatment, no matter what you wrote in your living will. · Your state may offer an online registry. This is a place where you can store your living will online so the doctors and nurses who need to treat you can find it right away. What should you think about when creating a living will? Talk about your end-of-life wishes with your family members and your doctor. Let them know what you want. That way the people making decisions for you won't be surprised by your choices. Think about these questions as you make your living will:  · Do you know enough about life support methods that might be used? If not, talk to your doctor so you know what might be done if you can't breathe on your own, your heart stops, or you're unable to swallow. · What things would you still want to be able to do after you receive life-support methods? Would you want to be able to walk? To speak? To eat on your own? To live without the help of machines? · If you have a choice, where do you want to be cared for? In your home? At a hospital or nursing home? · Do you want certain Judaism practices performed if you become very ill? · If you have a choice at the end of your life, where would you prefer to die? At home? In a hospital or nursing home? Somewhere else? · Would you prefer to be buried or cremated? · Do you want your organs to be donated after you die? What should you do with your living will? · Make sure that your family members and your health care agent have copies of your living will.   · Give your doctor a copy of your living will to keep in your medical record. If you have more than one doctor, make sure that each one has a copy. · You may want to put a copy of your living will where it can be easily found. Where can you learn more? Go to http://tanna-leonel.info/. Enter O871 in the search box to learn more about \"Learning About Living Perroy. \"  Current as of: August 8, 2016  Content Version: 11.3  © 5635-2803 [employer]. Care instructions adapted under license by [employer] (which disclaims liability or warranty for this information). If you have questions about a medical condition or this instruction, always ask your healthcare professional. [address] any warranty or liability for your use of this information.

## 2018-03-01 NOTE — MR AVS SNAPSHOT
[address] [facility] [address] [facility] 
[phone] Patient: Nicole Heath MRN:  [id number] Visit Information Date & Time Provider Department Dept. Phone Encounter #  
 3/1/2018 10:30 AM Deysi Blake, [facility] Internal Medicine [phone] [id number] Follow-up Instructions Return in about 1 year (around 3/1/2019), or if symptoms worsen or fail to improve, for FULL PHYSICAL - 30 minutes. Your Appointments 2018  1:40 PM  
Follow Up with Van Obregon MD  
Neurology Clinic at [facility]) Appt Note: f/u seizure, jrb 17  
 [facility], 
[address] [place] [address], [phone]  
[address] [facility], [address] [place] [address], [address] [place] [address] [phone] Upcoming Health Maintenance Date Due  
 MEDICARE YEARLY EXAM 3/1/2018 GLAUCOMA SCREENING Q2Y 2019 DTaP/Tdap/Td series (2 - Td) 2025 Allergies as of 3/1/2018  Review Complete On: 3/1/2018 By: Deysi Blake MD  
  
 Severity Noted Reaction Type Reactions Fosamax [Alendronate]  2010    Other (comments) GERD Keppra [Levetiracetam]  10/30/2014    Other (comments) \"IT DIDN'T WORK\" Lyrica [Pregabalin]  2010    Unknown (comments) Pravastatin  2010    Other (comments) Leg pain Current Immunizations  Reviewed on 3/1/2018 Name Date Influenza High Dose Vaccine PF 10/9/2017, 10/8/2016, 10/13/2015, 10/7/2014 Influenza Vaccine 2013 Influenza Vaccine Whole 9/15/2010 Pneumococcal Conjugate (PCV-13) 2015 TD Vaccine 2006 Tdap 2015 ZZZ-RETIRED (DO NOT USE) Pneumococcal Vaccine (Unspecified Type) 2005 Zoster Vaccine, Live 2009 Reviewed by Falguni Robles RN on 3/1/2018 at 10:42 AM  
You Were Diagnosed With   
  
 Codes Comments Medicare annual wellness visit, subsequent    -  Primary ICD-10-CM: Z00.00 ICD-9-CM: V70.0 ACP (advance care planning)     ICD-10-CM: Z71.89 ICD-9-CM: V65.49 Encounter for immunization     ICD-10-CM: U13 ICD-9-CM: V03.89 Pure hypercholesterolemia     ICD-10-CM: E78.00 ICD-9-CM: 272.0 Screening for alcoholism     ICD-10-CM: Z13.89 ICD-9-CM: V79.1 Chronic seasonal allergic rhinitis, unspecified trigger     ICD-10-CM: J30.2 ICD-9-CM: 477.8 Gastroesophageal reflux disease without esophagitis     ICD-10-CM: K21.9 ICD-9-CM: 530.81 Multiple thyroid nodules     ICD-10-CM: E04.2 ICD-9-CM: 241.1 Age-related osteoporosis without current pathological fracture     ICD-10-CM: M81.0 ICD-9-CM: 733.01 Complex partial seizure with impairment of consciousness ([facility] [address]     ICD-10-CM: H59.549 ICD-9-CM: 345.40 Vitals BP Pulse Temp Resp Height(growth percentile) Weight(growth percentile) 142/78 78 97.8 °F (36.6 °C) (Oral) 16 5' 7.5\" (1.715 m) 138 lb (62.6 kg) SpO2 BMI OB Status Smoking Status 98% 21.29 kg/m2 Hysterectomy Former Smoker Vitals History BMI and BSA Data Body Mass Index Body Surface Area  
 21.29 kg/m 2 1.73 m 2 Preferred Pharmacy Pharmacy Name Phone [address] [facility] [phone] Your Updated Medication List  
  
   
This list is accurate as of 3/1/18 11:19 AM.  Always use your most recent med list.  
  
  
  
  
 CALCIUM 500+D 500 mg(1,250mg) -200 unit per tablet Generic drug:  calcium-vitamin D Take 1 Tab by mouth two (2) times daily (with meals). CENTRUM SILVER Tab tablet Generic drug:  multivitamins-minerals-lutein Take 1 Tab by mouth daily. lacosamide 100 mg Tab tablet Commonly known as:  VIMPAT Take 1 Tab by mouth two (2) times a day. Max Daily Amount: 200 mg.  
  
 montelukast 10 mg tablet Commonly known as:  SINGULAIR  
 TAKE 1 TABLET DAILY  
  
 omeprazole 40 mg capsule Commonly known as:  PRILOSEC Take 1 Cap by mouth daily. PROLIA 60 mg/mL injection Generic drug:  denosumab 60 mg by SubCUTAneous route. Every 6 months  
  
 simvastatin 40 mg tablet Commonly known as:  ZOCOR  
TAKE 1 TABLET NIGHTLY  
  
 varicella-zoster recombinant (PF) 50 mcg/0.5 mL Susr injection Commonly known as:  SHINGRIX  
0.5 mL IM once and then repeat in 2-6 months VITAMIN D3 PO Take  by mouth. Prescriptions Printed Refills  
 omeprazole (PRILOSEC) 40 mg capsule 1 Sig: Take 1 Cap by mouth daily. Class: Print Route: Oral  
 simvastatin (ZOCOR) 40 mg tablet 1 Sig: TAKE 1 TABLET NIGHTLY Class: Print  
 montelukast (SINGULAIR) 10 mg tablet 1 Sig: TAKE 1 TABLET DAILY Class: Print  
 varicella-zoster recombinant, PF, (SHINGRIX) 50 mcg/0.5 mL susr injection 1 Si.5 mL IM once and then repeat in 2-6 months Class: Print We Performed the Following CBC W/O DIFF [99265 CPT(R)] LIPID PANEL [18428 CPT(R)] METABOLIC PANEL, COMPREHENSIVE [56766 CPT(R)] MO ANNUAL ALCOHOL SCREEN 15 MIN [id number] hospitals] Follow-up Instructions Return in about 1 year (around 3/1/2019), or if symptoms worsen or fail to improve, for FULL PHYSICAL - 30 minutes. Patient Instructions Medicare Wellness Visit, Female The best way to live healthy is to have a healthy lifestyle by eating a well-balanced diet, exercising regularly, limiting alcohol and stopping smoking. Regular physical exams and screening tests are another way to keep healthy. Preventive exams provided by your health care provider can find health problems before they become diseases or illnesses. Preventive services including immunizations, screening tests, monitoring and exams can help you take care of your own health.  
 
All people over age 72 should have a pneumovax  and and a prevnar shot to prevent pneumonia. These are once in a lifetime unless you and your provider decide differently. All people over 65 should have a yearly flu shot and a tetanus vaccine every 10 years. A bone mass density to screen for osteoporosis or thinning of the bones should be done every 2 years after 65. Screening for diabetes mellitus with a blood sugar test should be done every year. Glaucoma is a disease of the eye due to increased ocular pressure that can lead to blindness and it should be done every year by an eye professional. 
 
Cardiovascular screening tests that check for elevated lipids (fatty part of blood) which can lead to heart disease and strokes should be done every 5 years. Colorectal screening that evaluates for blood or polyps in your colon should be done yearly as a stool test or every five years as a flexible sigmoidoscope or every 10 years as a colonoscopy up to age 76. Breast cancer screening with a mammogram is recommended biennially  for women age 54-69. Screening for cervical cancer with a pap smear and pelvic exam is recommended for women after age 72 years every 2 years up to age 79 or when the provider and patient decide to stop. If there is a history of cervical abnormalities or other increased risk for cancer then the test is recommended yearly. Hepatitis C screening is also recommended for anyone born between [birth year] through [address]. A shingles vaccine is also recommended once in a lifetime after age 61. Your Medicare Wellness Exam is recommended annually. Here is a list of your current Health Maintenance items with a due date: 
Health Maintenance Due Topic Date Due  
 Annual Well Visit  03/01/2018 Ruth Ann Jean 0965 What is a living will? A living will is a legal form you use to write down the kind of care you want at the end of your life. It is used by the health professionals who will treat you if you aren't able to decide for yourself. If you put your wishes in writing, your loved ones and others will know what kind of care you want. They won't need to guess. This can ease your mind and be helpful to others. A living will is not the same as an estate or property will. An estate will explains what you want to happen with your money and property after you die. Is a living will a legal document? A living will is a legal document. Each state has its own laws about living min. If you move to another state, make sure that your living will is legal in the state where you now live. Or you might use a universal form that has been approved by many states. This kind of form can sometimes be completed and stored online. Your electronic copy will then be available wherever you have a connection to the Internet. In most cases, doctors will respect your wishes even if you have a form from a different state. · You don't need an  to complete a living will. But legal advice can be helpful if your state's laws are unclear, your health history is complicated, or your family can't agree on what should be in your living will. · You can change your living will at any time. Some people find that their wishes about end-of-life care change as their health changes. · In addition to making a living will, think about completing a medical power of  form. This form lets you name the person you want to make end-of-life treatment decisions for you (your \"health care agent\") if you're not able to. Many hospitals and nursing homes will give you the forms you need to complete a living will and a medical power of . · Your living will is used only if you can't make or communicate decisions for yourself anymore. If you become able to make decisions again, you can accept or refuse any treatment, no matter what you wrote in your living will. · Your state may offer an online registry.  This is a place where you can store your living will online so the doctors and nurses who need to treat you can find it right away. What should you think about when creating a living will? Talk about your end-of-life wishes with your family members and your doctor. Let them know what you want. That way the people making decisions for you won't be surprised by your choices. Think about these questions as you make your living will: · Do you know enough about life support methods that might be used? If not, talk to your doctor so you know what might be done if you can't breathe on your own, your heart stops, or you're unable to swallow. · What things would you still want to be able to do after you receive life-support methods? Would you want to be able to walk? To speak? To eat on your own? To live without the help of machines? · If you have a choice, where do you want to be cared for? In your home? At a hospital or nursing home? · Do you want certain Christianity practices performed if you become very ill? · If you have a choice at the end of your life, where would you prefer to die? At home? In a hospital or nursing home? Somewhere else? · Would you prefer to be buried or cremated? · Do you want your organs to be donated after you die? What should you do with your living will? · Make sure that your family members and your health care agent have copies of your living will. · Give your doctor a copy of your living will to keep in your medical record. If you have more than one doctor, make sure that each one has a copy. · You may want to put a copy of your living will where it can be easily found. Where can you learn more? Go to http://tanna-leonel.info/. Enter T498 in the search box to learn more about \"Learning About Living Perroy. \" Current as of: August 8, 2016 Content Version: 11.3 © 6777-4214 [employer].  Care instructions adapted under license by [address] (which disclaims liability or warranty for this information). If you have questions about a medical condition or this instruction, always ask your healthcare professional. [address] any warranty or liability for your use of this information. Introducing [facility]! Giuliana Burton introduces [employer] patient portal. Now you can access parts of your medical record, email your doctor's office, and request medication refills online. 1. In your internet browser, go to [email or web address] 2. Click on the First Time User? Click Here link in the Sign In box. You will see the New Member Sign Up page. 3. Enter your [employer] Access Code exactly as it appears below. You will not need to use this code after youve completed the sign-up process. If you do not sign up before the expiration date, you must request a new code. · [employer] Access Code: [facility] Expires: 5/30/2018 10:36 AM 
 
4. Enter the last four digits of your Social Security Number (xxxx) and Date of Birth (mm/dd/yyyy) as indicated and click Submit. You will be taken to the next sign-up page. 5. Create a [employer] ID. This will be your [employer] login ID and cannot be changed, so think of one that is secure and easy to remember. 6. Create a [employer] password. You can change your password at any time. 7. Enter your Password Reset Question and Answer. This can be used at a later time if you forget your password. 8. Enter your e-mail address. You will receive e-mail notification when new information is available in [address] 9. Click Sign Up. You can now view and download portions of your medical record. 10. Click the Download Summary menu link to download a portable copy of your medical information. If you have questions, please visit the Frequently Asked Questions section of the [employer] website.  Remember, [employer] is NOT to be used for urgent needs. For medical emergencies, dial 911. Now available from your iPhone and Android! Please provide this summary of care documentation to your next provider. Your primary care clinician is listed as Ralph Andrade. If you have any questions after today's visit, please call [phone].

## 2018-03-01 NOTE — PROGRESS NOTES
Ankita Palm is a [de-identified] y.o. female who was seen in clinic today (3/1/2018) for a full physical.      Assessment & Plan:   Diagnoses and all orders for this visit:    1. Medicare annual wellness visit, subsequent    2. ACP (advance care planning)    3. Encounter for immunization  -     varicella-zoster recombinant, PF, (SHINGRIX) 50 mcg/0.5 mL susr injection; 0.5 mL IM once and then repeat in 2-6 months    4. Pure hypercholesterolemia- well controlled, continue current treatment pending review of labs as she is taking her medication(s) as directed & without any side effects. -     simvastatin (ZOCOR) 40 mg tablet; TAKE 1 TABLET NIGHTLY  -     METABOLIC PANEL, COMPREHENSIVE  -     CBC W/O DIFF  -     LIPID PANEL    5. Screening for alcoholism  -     Annual  Alcohol Screen 15 min ()    6. Chronic seasonal allergic rhinitis, unspecified trigger- not ideally controlled, reviewed medication options for her, will try stopping Singulair to see if it is really adding anything, reviewed nasal steroids and nasal saline rinses, would avoid antihistamines. -     montelukast (SINGULAIR) 10 mg tablet; TAKE 1 TABLET DAILY    7. Gastroesophageal reflux disease without esophagitis- improved, however she reports she thinks taking a yogurt at night has helped more than the PPI, reviewed to try stopping PPI and reassessing. She will notify me. -     omeprazole (PRILOSEC) 40 mg capsule; Take 1 Cap by mouth daily. 8. Multiple thyroid nodules- asymptomatic, reviewed imaging from 5 years ago, reviewed pros/cons to repeat imaging, will continue to monitor for now, no changes. 9. Age-related osteoporosis without current pathological fracture- unknown control, last specialty records from 2 years ago, these will be re-requested along with updated imaging    10.  Complex partial seizure with impairment of consciousness ([facility] [address]- stable, defer to specialist          Follow-up Disposition:  Return in about 1 year (around 3/1/2019), or if symptoms worsen or fail to improve, for FULL PHYSICAL - 30 minutes. ------------------------------------------------------------------------------------------    Subjective: Annual Wellness Visit- Subsequent Visit    End of Life Planning: This was discussed with her today and she has an advanced directive - a copy HAS NOT been provided. Reviewed DNR/DNI and patient is interested in remaining a DNR, no changes made. Depression Screen:  PHQ over the last two weeks 3/1/2018   Little interest or pleasure in doing things Not at all   Feeling down, depressed or hopeless Not at all   Total Score PHQ 2 0         Fall Risk:   Fall Risk Assessment, last 12 mths 3/1/2018   Able to walk? Yes   Fall in past 12 months? No   Fall with injury? -   Number of falls in past 12 months -   Fall Risk Score -       Abuse Screen:  Abuse Screening Questionnaire 3/1/2018   Do you ever feel afraid of your partner? N   Are you in a relationship with someone who physically or mentally threatens you? N   Is it safe for you to go home? Y         Alcohol Risk Factor Screening: On any occasion during the past 3 months, have you had more than 3 drinks containing alcohol? No  Do you average more than 7 drinks per week? No    Hearing Loss: Hearing is good. Cognition Screen:  Has your family/caregiver stated any concerns about your memory: no  appropriate for age attention/concentration and appropriate safety awareness    Activities of Daily Living:    Requires assistance with: no ADLs  Home contains: handrails and grab bars  Exercise: not active    Adult Nutrition Screen:  No risk factors noted. Health Maintenance  Daily Aspirin: n/a  Bone Density: unknown, record requested, monitored by OBGYN  Glaucoma Screening: UTD    Immunizations:    Influenza: up to date. Tetanus: up to date. Shingles: due for Shingrix - script provided. Pneumonia: up to date. Cancer screening:    Cervical: reviewed guidelines, n/a.   Breast: reviewed guidelines, n/a. Colon: guidelines reviewed, n/a. Patient Care Team:  Lara Urena MD as PCP - General (Internal Medicine)  Jasmyn Castro MD (Gynecology)  Ruslan Pike MD (Neurology)  Carla England MD (Dermatology)  Emil Brennan MD (Dermatology)  Graeme Herman DO (Ophthalmology)       The following sections were reviewed & updated as appropriate: PMH, PSH, FH, and SH. 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 Authorizing Provider   CHOLECALCIFEROL, VITAMIN D3, (VITAMIN D3 PO) Take  by mouth. Yes Historical Provider   omeprazole (PRILOSEC) 40 mg capsule Take 40 mg by mouth daily. Yes Historical Provider   lacosamide (VIMPAT) 100 mg tab tablet Take 1 Tab by mouth two (2) times a day. Max Daily Amount: 200 mg. 10/10/17  Yes Ruslan Pike MD   simvastatin (ZOCOR) 40 mg tablet TAKE 1 TABLET NIGHTLY 10/1/17  Yes Lara Urena MD   montelukast (SINGULAIR) 10 mg tablet TAKE 1 TABLET DAILY 9/4/17  Yes Lara Urena MD   multivitamins-minerals-lutein (CENTRUM SILVER) tab tablet Take 1 Tab by mouth daily. Yes Historical Provider   Denosumab (PROLIA) 60 mg/mL injection 60 mg by SubCUTAneous route. Every 6 months   Yes Historical Provider   calcium-vitamin D (CALCIUM 500+D) 500 mg(1,250mg) -200 unit per tablet Take 1 Tab by mouth two (2) times daily (with meals).    Yes Historical Provider          Allergies   Allergen Reactions    Fosamax [Alendronate] Other (comments)     GERD    Keppra [Levetiracetam] Other (comments)     \"IT DIDN'T WORK\"    Lyrica [Pregabalin] Unknown (comments)    Pravastatin Other (comments)     Leg pain              Review of Systems   Constitutional: Negative for malaise/fatigue and weight loss. Respiratory: Negative for cough and shortness of breath. Cardiovascular: Negative for chest pain, palpitations and leg swelling. Gastrointestinal: Negative for abdominal pain, constipation, diarrhea, heartburn, nausea and vomiting. Genitourinary: Negative for frequency. Musculoskeletal: Negative for joint pain and myalgias. Skin: Negative for rash. Neurological: Negative for tingling, sensory change, focal weakness and headaches. Psychiatric/Behavioral: Negative for depression. The patient is not nervous/anxious and does not have insomnia. Objective:   Physical Exam   Constitutional: She appears well-developed. No distress. HENT:   Mouth/Throat: Mucous membranes are normal.   Eyes: Conjunctivae and lids are normal. No scleral icterus. Neck: Neck supple. No thyromegaly present. Cardiovascular: Regular rhythm and normal heart sounds. No murmur heard. Pulmonary/Chest: Effort normal and breath sounds normal. She has no wheezes. She has no rales. Abdominal: Soft. Bowel sounds are normal. She exhibits no mass. There is no hepatosplenomegaly. There is no tenderness. Musculoskeletal: She exhibits no edema. L lower leg is larger then the R, no edema   Lymphadenopathy:     She has no cervical adenopathy. Skin: No rash noted. Psychiatric: She has a normal mood and affect.  Her behavior is normal.          Visit Vitals    /78    Pulse 78    Temp 97.8 °F (36.6 °C) (Oral)    Resp 16    Ht 5' 7.5\" (1.715 m)    Wt 138 lb (62.6 kg)    SpO2 98%    BMI 21.29 kg/m2          Advised her to call back or return to office if symptoms worsen/change/persist.  Discussed expected course/resolution/complications of diagnosis in detail with patient. Medication risks/benefits/costs/interactions/alternatives discussed with patient. She was given an after visit summary which includes diagnoses, current medications, & vitals. She expressed understanding with the diagnosis and plan. Aspects of this note may have been generated using voice recognition software. Despite editing, there may be some syntax errors.        Kristin Loza MD

## 2018-03-02 ENCOUNTER — HOSPITAL ENCOUNTER (OUTPATIENT)
Dept: LAB | Age: 83
Discharge: HOME | End: 2018-03-02
Payer: MEDICARE

## 2018-03-02 PROCEDURE — 36415 COLL VENOUS BLD VENIPUNCTURE: CPT

## 2018-03-02 PROCEDURE — 85027 COMPLETE CBC AUTOMATED: CPT

## 2018-03-02 PROCEDURE — 80061 LIPID PANEL: CPT

## 2018-03-02 PROCEDURE — 80053 COMPREHEN METABOLIC PANEL: CPT

## 2018-03-02 NOTE — TELEPHONE ENCOUNTER
Left message for patient to return call. DNR is on file but Living Will is not on file. We will need to know how the Living Will was sent in - mailed or dropped off?

## 2018-03-03 DIAGNOSIS — J30.2 CHRONIC SEASONAL ALLERGIC RHINITIS, UNSPECIFIED TRIGGER: ICD-10-CM

## 2018-03-03 LAB
ALBUMIN SERPL-MCNC: 3.9 G/DL (ref 3.5–4.7)
ALBUMIN/GLOB SERPL: 1.4 {RATIO} (ref 1.2–2.2)
ALP SERPL-CCNC: 97 IU/L (ref 39–117)
ALT SERPL-CCNC: 18 IU/L (ref 0–32)
AST SERPL-CCNC: 25 IU/L (ref 0–40)
BILIRUB SERPL-MCNC: 0.3 MG/DL (ref 0–1.2)
BUN SERPL-MCNC: 16 MG/DL (ref 8–27)
BUN/CREAT SERPL: 24 (ref 12–28)
CALCIUM SERPL-MCNC: 9.3 MG/DL (ref 8.7–10.3)
CHLORIDE SERPL-SCNC: 105 MMOL/L (ref 96–106)
CHOLEST SERPL-MCNC: 166 MG/DL (ref 100–199)
CO2 SERPL-SCNC: 24 MMOL/L (ref 18–29)
CREAT SERPL-MCNC: 0.67 MG/DL (ref 0.57–1)
ERYTHROCYTE [DISTWIDTH] IN BLOOD BY AUTOMATED COUNT: 14.2 % (ref 12.3–15.4)
GFR SERPLBLD CREATININE-BSD FMLA CKD-EPI: 83 ML/MIN/1.73
GFR SERPLBLD CREATININE-BSD FMLA CKD-EPI: 96 ML/MIN/1.73
GLOBULIN SER CALC-MCNC: 2.8 G/DL (ref 1.5–4.5)
GLUCOSE SERPL-MCNC: 87 MG/DL (ref 65–99)
HCT VFR BLD AUTO: 43.9 % (ref 34–46.6)
HDLC SERPL-MCNC: 50 MG/DL
HGB BLD-MCNC: 14.4 G/DL (ref 11.1–15.9)
LDLC SERPL CALC-MCNC: 79 MG/DL (ref 0–99)
MCH RBC QN AUTO: 28.7 PG (ref 26.6–33)
MCHC RBC AUTO-ENTMCNC: 32.8 G/DL (ref 31.5–35.7)
MCV RBC AUTO: 88 FL (ref 79–97)
PLATELET # BLD AUTO: 278 X10E3/UL (ref 150–379)
POTASSIUM SERPL-SCNC: 4.5 MMOL/L (ref 3.5–5.2)
PROT SERPL-MCNC: 6.7 G/DL (ref 6–8.5)
RBC # BLD AUTO: 5.02 X10E6/UL (ref 3.77–5.28)
SODIUM SERPL-SCNC: 145 MMOL/L (ref 134–144)
TRIGL SERPL-MCNC: 187 MG/DL (ref 0–149)
VLDLC SERPL CALC-MCNC: 37 MG/DL (ref 5–40)
WBC # BLD AUTO: 5.1 X10E3/UL (ref 3.4–10.8)

## 2018-03-04 RX ORDER — MONTELUKAST SODIUM 10 MG/1
TABLET ORAL
Qty: 90 TAB | Refills: 1 | Status: SHIPPED | OUTPATIENT
Start: 2018-03-04 | End: 2019-03-04 | Stop reason: SDUPTHER

## 2018-03-05 NOTE — TELEPHONE ENCOUNTER
Verified patient identity with two identifiers. Spoke with patient by phone. Living will was brought in a year ago per patient. Checked in patient's 's chart. Both 's and this patient's living will was scanned into his chart. Will submit this for correction and have her Living Will scanned into her chart.

## 2018-03-19 NOTE — TELEPHONE ENCOUNTER
I called [employer] to get an update on this:  I found that [employer]leg[employer] sent out the claim with new information on 1/4/18 and it is still pending from medicare for this. I was notified that the invoice has been suspended so the patient does not owe anything at this time. I spoke with Neto Montano at [employer]    I called the patient's  and left this message.  I asked him to call me back with further concerns and to check back in the next few weeks

## 2018-03-28 DIAGNOSIS — G40.209 COMPLEX PARTIAL SEIZURE WITH IMPAIRMENT OF CONSCIOUSNESS (HCC): ICD-10-CM

## 2018-03-28 DIAGNOSIS — R42 DIZZINESS AND GIDDINESS: Chronic | ICD-10-CM

## 2018-03-28 DIAGNOSIS — G45.1 TRANSIENT ISCHEMIC ATTACK INVOLVING LEFT INTERNAL CAROTID ARTERY: ICD-10-CM

## 2018-03-28 DIAGNOSIS — I65.23 STENOSIS OF BOTH CAROTID ARTERIES WITHOUT INFARCTION: ICD-10-CM

## 2018-03-28 DIAGNOSIS — I67.89 CEREBRAL MICROVASCULAR DISEASE: ICD-10-CM

## 2018-03-28 RX ORDER — LACOSAMIDE 100 MG/1
100 TABLET ORAL 2 TIMES DAILY
Qty: 180 TAB | Refills: 3 | Status: SHIPPED | OUTPATIENT
Start: 2018-03-28 | End: 2018-07-05 | Stop reason: SDUPTHER

## 2018-03-28 NOTE — TELEPHONE ENCOUNTER
----- Message from [facility] sent at 3/28/2018  9:59 AM EDT -----  Regarding: Dr. Carmen Ritter Refill  The pt is requesting a refill on her \"zenpack\" medication. The Rx should be sent to [phone]. Best contact number is [phone].

## 2018-03-28 NOTE — TELEPHONE ENCOUNTER
Future Appointments  Date Time Provider Abraham Robles   11/19/2018 1:40 PM Vik Nieves MD [address]   3/4/2019 10:30 AM Jaren Garza MD [id number] [facility]                         Last Appointment My Department:  11/13/2017    Please advise of refill below. Only does 6 moths at a time  Requested Prescriptions     Pending Prescriptions Disp Refills    lacosamide (VIMPAT) 100 mg tab tablet 180 Tab 3     Sig: Take 1 Tab by mouth two (2) times a day. Max Daily Amount: 200 mg.

## 2018-05-08 ENCOUNTER — OFFICE VISIT (OUTPATIENT)
Dept: INTERNAL MEDICINE CLINIC | Age: 83
End: 2018-05-08

## 2018-05-08 VITALS
RESPIRATION RATE: 18 BRPM | DIASTOLIC BLOOD PRESSURE: 74 MMHG | WEIGHT: 135 LBS | TEMPERATURE: 97.9 F | OXYGEN SATURATION: 98 % | BODY MASS INDEX: 20.46 KG/M2 | HEART RATE: 70 BPM | HEIGHT: 68 IN | SYSTOLIC BLOOD PRESSURE: 110 MMHG

## 2018-05-08 DIAGNOSIS — J30.1 SEASONAL ALLERGIC RHINITIS DUE TO POLLEN: Primary | ICD-10-CM

## 2018-05-08 RX ORDER — BENZONATATE 200 MG/1
200 CAPSULE ORAL
Qty: 20 CAP | Refills: 0 | Status: SHIPPED | OUTPATIENT
Start: 2018-05-08 | End: 2018-05-15

## 2018-05-08 NOTE — MR AVS SNAPSHOT
[address] [facility] [address] [place] [address] 
[phone] Patient: Shailesh Chaney MRN:  [id number] Visit Information Date & Time Provider Department Dept. Phone Encounter #  
 2018  2:00 PM Chapo Sánchez, [address] [facility] [phone] [id number] Follow-up Instructions Return if symptoms worsen or fail to improve. Your Appointments 2018  1:40 PM  
Follow Up with Deib Mccabe MD  
[facility] at [facility] [address]) Appt Note: f/u seizure, jrb 17  
 [address] [facility], 
[address] [place] [address], [address] [phone]  
[address], [address], [address] [address]  
  
    
 3/4/2019 10:30 AM  
Medicare Physical with Chapo Sánchez MD  
[facility] [address]) Appt Note: Medicare Wellness [address] [facility] [address] [place] [place] [address] Upcoming Health Maintenance Date Due Influenza Age 5 to Adult 2018 MEDICARE YEARLY EXAM 3/2/2019 GLAUCOMA SCREENING Q2Y 2020 DTaP/Tdap/Td series (2 - Td) 2025 Allergies as of 2018  Review Complete On: 2018 By: Chapo Sánchez MD  
  
 Severity Noted Reaction Type Reactions Fosamax [Alendronate]  2010    Other (comments) GERD Keppra [Levetiracetam]  10/30/2014    Other (comments) \"IT DIDN'T WORK\" Lyrica [Pregabalin]  2010    Unknown (comments) Pravastatin  2010    Other (comments) Leg pain Current Immunizations  Reviewed on 2018 Name Date Influenza High Dose Vaccine PF 10/9/2017, 10/8/2016, 10/13/2015, 10/7/2014 Influenza Vaccine 2013 Influenza Vaccine Whole 9/15/2010 Pneumococcal Conjugate (PCV-13) 2015 TD Vaccine 2006 Tdap 2015 ZZZ-RETIRED (DO NOT USE) Pneumococcal Vaccine (Unspecified Type) 11/1/2005 Zoster Vaccine, Live 12/9/2009 Reviewed by Yony Billy RN on 5/8/2018 at  1:59 PM  
You Were Diagnosed With   
  
 Codes Comments Seasonal allergic rhinitis due to pollen    -  Primary ICD-10-CM: J30.1 ICD-9-CM: 477.0 Vitals BP Pulse Temp Resp Height(growth percentile) Weight(growth percentile) 110/74 70 97.9 °F (36.6 °C) (Oral) 18 5' 7.5\" (1.715 m) 135 lb (61.2 kg) SpO2 BMI OB Status Smoking Status 98% 20.83 kg/m2 Hysterectomy Former Smoker BMI and BSA Data Body Mass Index Body Surface Area  
 20.83 kg/m 2 1.71 m 2 Preferred Pharmacy Pharmacy Name Phone [facility] [address] [facility], [address] [place]  [phone] Your Updated Medication List  
  
   
This list is accurate as of 5/8/18  2:37 PM.  Always use your most recent med list.  
  
  
  
  
 benzonatate 200 mg capsule Commonly known as:  TESSALON Take 1 Cap by mouth three (3) times daily as needed for Cough for up to 7 days. CALCIUM 500+D 500 mg(1,250mg) -200 unit per tablet Generic drug:  calcium-vitamin D Take 1 Tab by mouth two (2) times daily (with meals). CENTRUM SILVER Tab tablet Generic drug:  multivitamins-minerals-lutein Take 1 Tab by mouth daily. lacosamide 100 mg Tab tablet Commonly known as:  VIMPAT Take 1 Tab by mouth two (2) times a day. Max Daily Amount: 200 mg.  
  
 montelukast 10 mg tablet Commonly known as:  SINGULAIR  
TAKE 1 TABLET DAILY PROLIA 60 mg/mL injection Generic drug:  denosumab 60 mg by SubCUTAneous route. Every 6 months  
  
 simvastatin 40 mg tablet Commonly known as:  ZOCOR  
TAKE 1 TABLET NIGHTLY  
  
 varicella-zoster recombinant (PF) 50 mcg/0.5 mL Susr injection Commonly known as:  SHINGRIX  
0.5 mL IM once and then repeat in 2-6 months VITAMIN D3 PO Take  by mouth. Prescriptions Sent to Pharmacy Refills  
 benzonatate (TESSALON) 200 mg capsule 0 Sig: Take 1 Cap by mouth three (3) times daily as needed for Cough for up to 7 days. Class: Normal  
 Pharmacy: [facility] [address] [facility], [address] Ph #: [phone] Route: Oral  
  
Follow-up Instructions Return if symptoms worsen or fail to improve. Patient Instructions Allergy Treatments: 
1. Nasal rinses:  Saline rinses or salt water rinses or Neti pot 2. Anti-histamines: allegra (fenofexadine) or claritn (loratidine) or zyrtec (certizine) 3. Nasal steroids: Nasacort and Flonase (are over the counter & prescription) Sinusitis: Care Instructions Your Care Instructions Sinusitis is an infection of the lining of the sinus cavities in your head. Sinusitis often follows a cold. It causes pain and pressure in your head and face. In most cases, sinusitis gets better on its own in 1 to 2 weeks. But some mild symptoms may last for several weeks. Sometimes antibiotics are needed. Follow-up care is a key part of your treatment and safety. Be sure to make and go to all appointments, and call your doctor if you are having problems. It's also a good idea to know your test results and keep a list of the medicines you take. How can you care for yourself at home? · Take an over-the-counter pain medicine, such as acetaminophen (Tylenol), ibuprofen (Advil, Motrin), or naproxen (Aleve). Read and follow all instructions on the label. · If the doctor prescribed antibiotics, take them as directed. Do not stop taking them just because you feel better. You need to take the full course of antibiotics. · Be careful when taking over-the-counter cold or flu medicines and Tylenol at the same time. Many of these medicines have acetaminophen, which is Tylenol.  Read the labels to make sure that you are not taking more than the recommended dose. Too much acetaminophen (Tylenol) can be harmful. · Breathe warm, moist air from a steamy shower, a hot bath, or a sink filled with hot water. Avoid cold, dry air. Using a humidifier in your home may help. Follow the directions for cleaning the machine. · Use saline (saltwater) nasal washes to help keep your nasal passages open and wash out mucus and bacteria. You can buy saline nose drops at a grocery store or drugstore. Or you can make your own at home by adding 1 teaspoon of salt and 1 teaspoon of baking soda to 2 cups of distilled water. If you make your own, fill a bulb syringe with the solution, insert the tip into your nostril, and squeeze gently. Louisa Stoddard your nose. · Put a hot, wet towel or a warm gel pack on your face 3 or 4 times a day for 5 to 10 minutes each time. · Try a decongestant nasal spray like oxymetazoline (Afrin). Do not use it for more than 3 days in a row. Using it for more than 3 days can make your congestion worse. When should you call for help? Call your doctor now or seek immediate medical care if: 
? · You have new or worse swelling or redness in your face or around your eyes. ? · You have a new or higher fever. ? Watch closely for changes in your health, and be sure to contact your doctor if: 
? · You have new or worse facial pain. ? · The mucus from your nose becomes thicker (like pus) or has new blood in it. ? · You are not getting better as expected. Where can you learn more? Go to http://tanna-leonel.info/. Enter D964 in the search box to learn more about \"Sinusitis: Care Instructions. \" Current as of: May 12, 2017 Content Version: 11.4 © 8215-2931 [employer]. Care instructions adapted under license by [employer] (which disclaims liability or warranty for this information).  If you have questions about a medical condition or this instruction, always ask your healthcare professional. [place] [address] any warranty or liability for your use of this information. Introducing [facility] SERVICES! [employer] introduces [employer] patient portal. Now you can access parts of your medical record, email your doctor's office, and request medication refills online. 1. In your internet browser, go to [email or web address] 2. Click on the First Time User? Click Here link in the Sign In box. You will see the New Member Sign Up page. 3. Enter your [employer] Access Code exactly as it appears below. You will not need to use this code after youve completed the sign-up process. If you do not sign up before the expiration date, you must request a new code. · [employer]ader Access Code: [facility] Expires: 5/30/2018 11:36 AM 
 
4. Enter the last four digits of your Social Security Number (xxxx) and Date of Birth (mm/dd/yyyy) as indicated and click Submit. You will be taken to the next sign-up page. 5. Create a Tidalwave Trader ID. This will be your Tidal[employer] Trader login ID and cannot be changed, so think of one that is secure and easy to remember. 6. Create a [employer] password. You can change your password at any time. 7. Enter your Password Reset Question and Answer. This can be used at a later time if you forget your password. 8. Enter your e-mail address. You will receive e-mail notification when new information is available in [address] 9. Click Sign Up. You can now view and download portions of your medical record. 10. Click the Download Summary menu link to download a portable copy of your medical information. If you have questions, please visit the Frequently Asked Questions section of the [employer] website. Remember, [employer] is NOT to be used for urgent needs. For medical emergencies, dial 911. Now available from your iPhone and Android! Please provide this summary of care documentation to your next provider. Your primary care clinician is listed as Kenia Swain. If you have any questions after today's visit, please call [phone].

## 2018-05-08 NOTE — PROGRESS NOTES
Carol Trejo is a 82 y.o. female who was seen in clinic today (5/8/2018). Assessment & Plan:   Diagnoses and all orders for this visit:    1. Seasonal allergic rhinitis due to pollen- discussed diagnosis & treatment options, discussed allergic vs viral vs bacterial etiologies and at this time favor an allergic etiology (based on duration & lack of localizing symptoms), reviewed the importance of avoiding unnecessary antibiotic therapy, will start on meds below, reviewed which OTC medications to use and avoid, expected time course for resolution & red flags were reviewed with her to RTC or notify me. She will update me in 3-5 days and if no changes reviewed will give trial of abx.   -     benzonatate (TESSALON) 200 mg capsule; Take 1 Cap by mouth three (3) times daily as needed for Cough for up to 7 days. Follow-up Disposition:  Return if symptoms worsen or fail to improve. Subjective:   Tyrese Franklin was seen today for Allergies    URI Review  Tyrese Franklin returns to clinic today to talk about: URI symptoms for 1 month ago, which are unchanged since that time. She reports sore throat, post nasal drip, productive cough (worse at night), fatigue and rhinorrhea. She denies a history of: fever, chills, ear pain, chest congestion, wheezing and SOB/PRINGLE. Treatments have included: Nyquil & Dayquil & Allegra which have been temporarily effective. Relevant PMH: allergic rhinitis. Patient reports sick contacts: no.          Prior to Admission medications    Medication Sig Start Date End Date Taking? Authorizing Provider   lacosamide (VIMPAT) 100 mg tab tablet Take 1 Tab by mouth two (2) times a day. Max Daily Amount: 200 mg. 3/28/18  Yes Breann Maldonado MD   montelukast (SINGULAIR) 10 mg tablet TAKE 1 TABLET DAILY 3/4/18  Yes Latisha Sanchez MD   CHOLECALCIFEROL, VITAMIN D3, (VITAMIN D3 PO) Take  by mouth.    Yes Historical Provider   simvastatin (ZOCOR) 40 mg tablet TAKE 1 TABLET NIGHTLY 3/1/18  Yes Kimo Wynn MD   varicella-zoster recombinant, PF, [facility]) 50 mcg/0.5 mL susr injection 0.5 mL IM once and then repeat in 2-6 months 3/1/18  Yes Kimo Wynn MD   multivitamins-minerals-lutein (CENTRUM SILVER) tab tablet Take 1 Tab by mouth daily. Yes Historical Provider   Denosumab (PROLIA) 60 mg/mL injection 60 mg by SubCUTAneous route. Every 6 months   Yes Historical Provider   calcium-vitamin D (CALCIUM 500+D) 500 mg(1,250mg) -200 unit per tablet Take 1 Tab by mouth two (2) times daily (with meals). Yes Historical Provider          Allergies   Allergen Reactions    Fosamax [Alendronate] Other (comments)     GERD    Keppra [Levetiracetam] Other (comments)     \"IT DIDN'T WORK\"    Lyrica [Pregabalin] Unknown (comments)    Pravastatin Other (comments)     Leg pain           ROS - per HPI        Objective:   Physical Exam   Constitutional: No distress. HENT:   Right Ear: Tympanic membrane is not erythematous and not bulging. No middle ear effusion. Left Ear: Tympanic membrane is not erythematous and not bulging. No middle ear effusion. Nose: Mucosal edema present. No rhinorrhea. Right sinus exhibits maxillary sinus tenderness. Right sinus exhibits no frontal sinus tenderness. Left sinus exhibits maxillary sinus tenderness. Left sinus exhibits no frontal sinus tenderness. Mouth/Throat: Uvula is midline and mucous membranes are normal. No oropharyngeal exudate or posterior oropharyngeal erythema. Eyes: Conjunctivae are normal. No scleral icterus. Neck: Neck supple. Cardiovascular: Regular rhythm and normal heart sounds. No murmur heard. Pulmonary/Chest: Effort normal and breath sounds normal. She has no wheezes. She has no rales. Lymphadenopathy:     She has no cervical adenopathy.          Visit Vitals    /74    Pulse 70    Temp 97.9 °F (36.6 °C) (Oral)    Resp 18    Ht 5' 7.5\" (1.715 m)    Wt 135 lb (61.2 kg)    SpO2 98%    BMI 20.83 kg/m2 Disclaimer:  Advised her to call back or return to office if symptoms worsen/change/persist.  Discussed expected course/resolution/complications of diagnosis in detail with patient. Medication risks/benefits/costs/interactions/alternatives discussed with patient. She was given an after visit summary which includes diagnoses, current medications, & vitals. She expressed understanding with the diagnosis and plan. Aspects of this note may have been generated using voice recognition software. Despite editing, there may be some syntax errors.        Paramjit Martin MD

## 2018-05-08 NOTE — PATIENT INSTRUCTIONS
Allergy Treatments:  1. Nasal rinses:  Saline rinses or salt water rinses or Neti pot  2. Anti-histamines: allegra (fenofexadine) or claritn (loratidine) or zyrtec (certizine)  3. Nasal steroids: Nasacort and Flonase (are over the counter & prescription)          Sinusitis: Care Instructions  Your Care Instructions    Sinusitis is an infection of the lining of the sinus cavities in your head. Sinusitis often follows a cold. It causes pain and pressure in your head and face. In most cases, sinusitis gets better on its own in 1 to 2 weeks. But some mild symptoms may last for several weeks. Sometimes antibiotics are needed. Follow-up care is a key part of your treatment and safety. Be sure to make and go to all appointments, and call your doctor if you are having problems. It's also a good idea to know your test results and keep a list of the medicines you take. How can you care for yourself at home? · Take an over-the-counter pain medicine, such as acetaminophen (Tylenol), ibuprofen (Advil, Motrin), or naproxen (Aleve). Read and follow all instructions on the label. · If the doctor prescribed antibiotics, take them as directed. Do not stop taking them just because you feel better. You need to take the full course of antibiotics. · Be careful when taking over-the-counter cold or flu medicines and Tylenol at the same time. Many of these medicines have acetaminophen, which is Tylenol. Read the labels to make sure that you are not taking more than the recommended dose. Too much acetaminophen (Tylenol) can be harmful. · Breathe warm, moist air from a steamy shower, a hot bath, or a sink filled with hot water. Avoid cold, dry air. Using a humidifier in your home may help. Follow the directions for cleaning the machine. · Use saline (saltwater) nasal washes to help keep your nasal passages open and wash out mucus and bacteria. You can buy saline nose drops at a grocery store or drugstore.  Or you can make your own at home by adding 1 teaspoon of salt and 1 teaspoon of baking soda to 2 cups of distilled water. If you make your own, fill a bulb syringe with the solution, insert the tip into your nostril, and squeeze gently. Louisa Stoddard your nose. · Put a hot, wet towel or a warm gel pack on your face 3 or 4 times a day for 5 to 10 minutes each time. · Try a decongestant nasal spray like oxymetazoline (Afrin). Do not use it for more than 3 days in a row. Using it for more than 3 days can make your congestion worse. When should you call for help? Call your doctor now or seek immediate medical care if:  ? · You have new or worse swelling or redness in your face or around your eyes. ? · You have a new or higher fever. ? Watch closely for changes in your health, and be sure to contact your doctor if:  ? · You have new or worse facial pain. ? · The mucus from your nose becomes thicker (like pus) or has new blood in it. ? · You are not getting better as expected. Where can you learn more? Go to http://tanna-leonel.info/. Enter V966 in the search box to learn more about \"Sinusitis: Care Instructions. \"  Current as of: May 12, 2017  Content Version: 11.4  © 8577-1511 [employer]. Care instructions adapted under license by [employer] (which disclaims liability or warranty for this information). If you have questions about a medical condition or this instruction, always ask your healthcare professional. [place] [address] any warranty or liability for your use of this information.

## 2018-07-03 DIAGNOSIS — R42 DIZZINESS AND GIDDINESS: Chronic | ICD-10-CM

## 2018-07-03 DIAGNOSIS — G40.209 COMPLEX PARTIAL SEIZURE WITH IMPAIRMENT OF CONSCIOUSNESS (HCC): ICD-10-CM

## 2018-07-03 DIAGNOSIS — I65.23 STENOSIS OF BOTH CAROTID ARTERIES WITHOUT INFARCTION: ICD-10-CM

## 2018-07-03 DIAGNOSIS — I67.89 CEREBRAL MICROVASCULAR DISEASE: ICD-10-CM

## 2018-07-03 DIAGNOSIS — G45.1 TRANSIENT ISCHEMIC ATTACK INVOLVING LEFT INTERNAL CAROTID ARTERY: ICD-10-CM

## 2018-07-03 NOTE — TELEPHONE ENCOUNTER
Patient would like a refill on vimpat.  She states that the prescription would have to be mailed to her

## 2018-07-05 RX ORDER — LACOSAMIDE 100 MG/1
100 TABLET ORAL 2 TIMES DAILY
Qty: 180 TAB | Refills: 1 | Status: SHIPPED | OUTPATIENT
Start: 2018-07-05 | End: 2018-10-29 | Stop reason: SDUPTHER

## 2018-07-05 NOTE — TELEPHONE ENCOUNTER
Future Appointments  Date Time Provider Abraham Robles   11/19/2018 1:40 PM Shaheen Singh MD [address]   3/4/2019 10:30 AM [place] Her[place] [id number] [facility]                         Last Appointment My Department:  11/13/2017    Please advise of refill below. Requested Prescriptions     Pending Prescriptions Disp Refills    lacosamide (VIMPAT) 100 mg tab tablet 180 Tab 1     Sig: Take 1 Tab by mouth two (2) times a day. Max Daily Amount: 200 mg.

## 2018-10-29 ENCOUNTER — OFFICE VISIT (OUTPATIENT)
Dept: NEUROLOGY | Age: 83
End: 2018-10-29

## 2018-10-29 VITALS
HEIGHT: 68 IN | WEIGHT: 130 LBS | BODY MASS INDEX: 19.7 KG/M2 | HEART RATE: 76 BPM | SYSTOLIC BLOOD PRESSURE: 130 MMHG | OXYGEN SATURATION: 97 % | DIASTOLIC BLOOD PRESSURE: 68 MMHG

## 2018-10-29 DIAGNOSIS — G40.209 COMPLEX PARTIAL SEIZURE WITH IMPAIRMENT OF CONSCIOUSNESS (HCC): ICD-10-CM

## 2018-10-29 DIAGNOSIS — I67.89 CEREBRAL MICROVASCULAR DISEASE: ICD-10-CM

## 2018-10-29 DIAGNOSIS — I65.23 STENOSIS OF BOTH CAROTID ARTERIES WITHOUT INFARCTION: ICD-10-CM

## 2018-10-29 DIAGNOSIS — R42 DIZZINESS AND GIDDINESS: Primary | Chronic | ICD-10-CM

## 2018-10-29 DIAGNOSIS — G45.1 TRANSIENT ISCHEMIC ATTACK INVOLVING LEFT INTERNAL CAROTID ARTERY: ICD-10-CM

## 2018-10-29 RX ORDER — LACOSAMIDE 100 MG/1
100 TABLET ORAL 2 TIMES DAILY
Qty: 180 TAB | Refills: 4 | Status: SHIPPED | OUTPATIENT
Start: 2018-10-29 | End: 2019-07-16 | Stop reason: SDUPTHER

## 2018-10-29 RX ORDER — ESCITALOPRAM OXALATE 5 MG/1
5 TABLET ORAL DAILY
Qty: 30 TAB | Refills: 11 | Status: SHIPPED | OUTPATIENT
Start: 2018-10-29 | End: 2019-05-13 | Stop reason: SDUPTHER

## 2018-10-29 NOTE — LETTER
10/29/2018 3:25 PM 
 
Patient:  Lennie Gutiérrez YOB: 1935 Date of Visit: 10/29/2018 Dear No Recipients: Thank you for referring Ms. Lennie Gutiérrez to me for evaluation/treatment. Below are the relevant portions of my assessment and plan of care. Consult Subjective:  
 
Lennie Gutiérrez is a 82 y. o.right-handed  female seen today for evaluation of new complaint of feeling more irritable and snapping at her  more, feeling anxious and depressed, and wants to know whether not she can decrease her seizure medication because she skipped the dose one day and felt a little bit better. She denies any recurrent spells or lightheaded spells that she was complaining of last visit. 3 years ago she had an EEG that showed left temporal spikes still, and her Vimpat level is only 7.5 white in the mid range of normal.  I would be a little leery to cut her medicine down, but we will check an EEG, and if that does not show that much activity we might cut her morning dose in half. She is currently on Vimpat 100 mg twice a day tolerating it well. I suggested she try Lexapro 5 mg each day, and advance to 10 if needed to help with her anxiety I think is more situational due to the stress of not having a stove, phone,  who is deaf, and other problems at home. We will check her Vimpat level to make sure it is in the therapeutic range also. She has had no side effects on the medication, and is tolerating it well otherwise, and still able to get it from her mail order pharmacy at a reasonable price. She's had no falls, no new numbness or weakness in her arms and legs, no headache, no visual problems, no other bulbar symptoms, or neck pain or back pain. She does try to exercise regularly, and does take vitamins. Her T06 and folic acid levels were normal in 2012. She has no signs of neuropathy.  She actually walks fairly well and has only a very mild difficulty with tandem walking, but has a negative Romberg. She had EEGs in the past that showed a left temporal abnormality. She  Is thought to have complex partial seizures. 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 Her Dopplers done 2 year ago have shown mild less than 50% disease bilaterally. She will continue a baby aspirin for her microvascular disease, but she has had no strokelike symptoms A complete review of systems in symptoms was negative for any other new medical problems, complications or illnesses. She has a past history of goiter, allergies, osteoporosis, SVT, DJD, GERD, reduced hearing, hyperlipidemia, hysterectomy, detached retina repair, colonoscopy, rectocele surgery esophageal dilatation and lip surgery for squamous cell cancer. Past Medical History:  
Diagnosis Date  Allergic rhinitis  Arthritis  Combined forms of age-related cataract of left eye 8/26/2016 KPE IOL OS  
 Combined forms of age-related cataract of right eye 8/26/2016 KPE IOL OD  
 GERD (gastroesophageal reflux disease)  Goiter   
 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   
 last seizure 2015  SVT (supraventricular tachycardia) ([address]   
 as of 10/14/14 pt unaware she has ever had this and does not see a cardiologist  
  
Past Surgical History:  
Procedure Laterality Date  HX CATARACT REMOVAL Right 08/31/2016  HX CATARACT REMOVAL Left 09/14/2016  HX COLONOSCOPY  10/7/2010  HX ENDOSCOPY  10/7/2010  HX HYSTERECTOMY  1970's NIDIA 1974 - ovaries in,vaginal repair [id number] PROCEDURES  2012 7/12- bx of right lower lip, 10/12- s/p mohs for SCC  HX RETINAL DETACHMENT REPAIR Left   
 detached retina, with laser repair  REPAIR OF RECTOCELE    
 surgery for rectocele and prolapse  - Dr Marcie Seymour Family History Problem Relation Age of Onset  Heart Disease Mother  Cancer Father   
     lung  Cancer Sister   
     colon  No Known Problems Son  No Known Problems Daughter  Anesth Problems Neg Hx Social History Tobacco Use  Smoking status: Former Smoker Packs/day: 0.50 Years: 20.00 Pack years: 10.00 Types: Cigarettes Last attempt to quit: 10/30/1969 Years since quittin.0  Smokeless tobacco: Never Used Substance Use Topics  Alcohol use: No  
   
Current Outpatient Medications Medication Sig Dispense Refill  lacosamide (VIMPAT) 100 mg tab tablet Take 1 Tab by mouth two (2) times a day. Max Daily Amount: 200 mg. 180 Tab 4  
 escitalopram oxalate (LEXAPRO) 5 mg tablet Take 1 Tab by mouth daily. 30 Tab 11  
 montelukast (SINGULAIR) 10 mg tablet TAKE 1 TABLET DAILY 90 Tab 1  
 CHOLECALCIFEROL, VITAMIN D3, (VITAMIN D3 PO) Take  by mouth.  simvastatin (ZOCOR) 40 mg tablet TAKE 1 TABLET NIGHTLY 90 Tab 1  
 multivitamins-minerals-lutein (CENTRUM SILVER) tab tablet Take 1 Tab by mouth daily.  Denosumab (PROLIA) 60 mg/mL injection 60 mg by SubCUTAneous route. Every 6 months  calcium-vitamin D (CALCIUM 500+D) 500 mg(1,250mg) -200 unit per tablet Take 1 Tab by mouth two (2) times daily (with meals). Allergies Allergen Reactions  Fosamax [Alendronate] Other (comments) GERD  Keppra [Levetiracetam] Other (comments) \"IT DIDN'T WORK\"  Lyrica [Pregabalin] Unknown (comments)  Pravastatin Other (comments) Leg pain Review of Systems: A comprehensive review of systems was negative except for: Ears, nose, mouth, throat, and face: positive for hearing loss and sore mouth Musculoskeletal: positive for myalgias, arthralgias and stiff joints Neurological: positive for dizziness, seizures, coordination problems and gait problems Objective: I 
 
 
NEUROLOGICAL EXAM: 
 
Appearance: The patient is well developed, well nourished, provides a coherent history and is in no acute distress. Mental Status: Oriented to time, place and person, and the president, fund of knowledge and cognitive function seems normal, speech is without aphasia or dysarthria. Mood and affect appropriate but slightly anxious and depressed. Cranial Nerves:   Intact visual fields. Fundi are benign. JUSTIN, EOM's full, no nystagmus, no ptosis. Facial sensation is normal. Corneal reflexes are not tested. Facial movement is symmetric. Hearing is reduced bilaterally. Palate is midline with normal sternocleidomastoid and trapezius muscles are normal. Tongue is midline. Neck without meningismus or bruits Temporal arteries are not tender or enlarged Motor:  5/5 strength in upper and lower proximal and distal muscles. Normal bulk and tone. No fasciculations. Reflexes:   Deep tendon reflexes 2+/4 and symmetrical. No Babinski or clonus present Sensory:   Normal to touch, pinprick and DSS, and vibration mildly decreased in both feet. Gait:  Normal gait. Tremor:   No tremor noted. Cerebellar:  No cerebellar signs present. The patient has slight difficulty doing tandem walking Neurovascular:  Normal heart sounds and regular rhythm, peripheral pulses mildly reduced in both feet, and no carotid bruits. Assessment:  
 
Plan:  
 
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 Her medications were just renewed so they were not renewed today. 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 
Patient has relatively prominent microvascular disease on MRI but negative Dopplers and she will continue her aspirin Medication refills sent in 1 month ago Patient to continue taking her vitamins and vitamin D every day, start exercising more and see if she improves. Patient is to continue Vimpat 100 mg b.i.d. Followup in 6 months time or earlier as needed, and patient to call if any problem, and to get her test results Signed By: Zelma Homans, MD   
 October 29, 2018 This note will not be viewable in [address] If you have questions, please do not hesitate to call me. I look forward to following Ms. Richard Dennising along with you. Sincerely, Zelma Homans, MD

## 2018-10-29 NOTE — PROGRESS NOTES
Consult    Subjective:     Valente Henriquez is a 82 y. o.right-handed  female seen today for evaluation of new complaint of feeling more irritable and snapping at her  more, feeling anxious and depressed, and wants to know whether not she can decrease her seizure medication because she skipped the dose one day and felt a little bit better. She denies any recurrent spells or lightheaded spells that she was complaining of last visit. 3 years ago she had an EEG that showed left temporal spikes still, and her Vimpat level is only 7.5 white in the mid range of normal.  I would be a little leery to cut her medicine down, but we will check an EEG, and if that does not show that much activity we might cut her morning dose in half. She is currently on Vimpat 100 mg twice a day tolerating it well. I suggested she try Lexapro 5 mg each day, and advance to 10 if needed to help with her anxiety I think is more situational due to the stress of not having a stove, phone,  who is deaf, and other problems at home. We will check her Vimpat level to make sure it is in the therapeutic range also. She has had no side effects on the medication, and is tolerating it well otherwise, and still able to get it from her mail order pharmacy at a reasonable price. She's had no falls, no new numbness or weakness in her arms and legs, no headache, no visual problems, no other bulbar symptoms, or neck pain or back pain. She does try to exercise regularly, and does take vitamins. Her F65 and folic acid levels were normal in 2012. She has no signs of neuropathy. She actually walks fairly well and has only a very mild difficulty with tandem walking, but has a negative Romberg. She had EEGs in the past that showed a left temporal abnormality. She  Is thought to have complex partial seizures. 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  Her Dopplers done 2 year ago have shown mild less than 50% disease bilaterally. She will continue a baby aspirin for her microvascular disease, but she has had no strokelike symptoms    A complete review of systems in symptoms was negative for any other new medical problems, complications or illnesses. She has a past history of goiter, allergies, osteoporosis, SVT, DJD, GERD, reduced hearing, hyperlipidemia, hysterectomy, detached retina repair, colonoscopy, rectocele surgery esophageal dilatation and lip surgery for squamous cell cancer.     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    Right lower lip, s/p moh's surgery (10/30/12)     Seizures ([address]     last seizure 2015    SVT (supraventricular tachycardia) ([address]     as of 10/14/14 pt unaware she has ever had this and does not see a cardiologist      Past Surgical History:   Procedure Laterality Date    HX CATARACT REMOVAL Right 08/31/2016    HX CATARACT REMOVAL Left 09/14/2016    HX COLONOSCOPY  10/7/2010         HX ENDOSCOPY  10/7/2010         HX HYSTERECTOMY  1970's    NIDIA 1974 - ovaries in,vaginal repair    HX MOHS PROCEDURES  2012 7/12- bx of right lower lip, 10/12- s/p mohs for SCC    HX RETINAL DETACHMENT REPAIR Left     detached retina, with laser repair    REPAIR OF RECTOCELE      surgery for rectocele and prolapse 8/06 - Dr Kirit Valladares     Family History   Problem Relation Age of Onset    Heart Disease Mother     Cancer Father         lung    Cancer Sister         colon    No Known Problems Son     No Known Problems Daughter     Anesth Problems Neg Hx       Social History     Tobacco Use    Smoking status: Former Smoker     Packs/day: 0.50     Years: 20.00     Pack years: 10.00     Types: Cigarettes     Last attempt to quit: 10/30/1969     Years since quittin.0    Smokeless tobacco: Never Used   Substance Use Topics    Alcohol use: No       Current Outpatient Medications   Medication Sig Dispense Refill    lacosamide (VIMPAT) 100 mg tab tablet Take 1 Tab by mouth two (2) times a day. Max Daily Amount: 200 mg. 180 Tab 4    escitalopram oxalate (LEXAPRO) 5 mg tablet Take 1 Tab by mouth daily. 30 Tab 11    montelukast (SINGULAIR) 10 mg tablet TAKE 1 TABLET DAILY 90 Tab 1    CHOLECALCIFEROL, VITAMIN D3, (VITAMIN D3 PO) Take  by mouth.  simvastatin (ZOCOR) 40 mg tablet TAKE 1 TABLET NIGHTLY 90 Tab 1    multivitamins-minerals-lutein (CENTRUM SILVER) tab tablet Take 1 Tab by mouth daily.  Denosumab (PROLIA) 60 mg/mL injection 60 mg by SubCUTAneous route. Every 6 months      calcium-vitamin D (CALCIUM 500+D) 500 mg(1,250mg) -200 unit per tablet Take 1 Tab by mouth two (2) times daily (with meals). Allergies   Allergen Reactions    Fosamax [Alendronate] Other (comments)     GERD    Keppra [Levetiracetam] Other (comments)     \"IT DIDN'T WORK\"    Lyrica [Pregabalin] Unknown (comments)    Pravastatin Other (comments)     Leg pain        Review of Systems:  A comprehensive review of systems was negative except for: Ears, nose, mouth, throat, and face: positive for hearing loss and sore mouth  Musculoskeletal: positive for myalgias, arthralgias and stiff joints  Neurological: positive for dizziness, seizures, coordination problems and gait problems     Objective:     I      NEUROLOGICAL EXAM:    Appearance: The patient is well developed, well nourished, provides a coherent history and is in no acute distress.    Mental Status: Oriented to time, place and person, and the president, fund of knowledge and cognitive function seems normal, speech is without aphasia or dysarthria. Mood and affect appropriate but slightly anxious and depressed. Cranial Nerves:   Intact visual fields. Fundi are benign. JUSTIN, EOM's full, no nystagmus, no ptosis. Facial sensation is normal. Corneal reflexes are not tested. Facial movement is symmetric. Hearing is reduced bilaterally. Palate is midline with normal sternocleidomastoid and trapezius muscles are normal. Tongue is midline. Neck without meningismus or bruits  Temporal arteries are not tender or enlarged   Motor:  5/5 strength in upper and lower proximal and distal muscles. Normal bulk and tone. No fasciculations. Reflexes:   Deep tendon reflexes 2+/4 and symmetrical. No Babinski or clonus present   Sensory:   Normal to touch, pinprick and DSS, and vibration mildly decreased in both feet. Gait:  Normal gait. Tremor:   No tremor noted. Cerebellar:  No cerebellar signs present. The patient has slight difficulty doing tandem walking   Neurovascular:  Normal heart sounds and regular rhythm, peripheral pulses mildly reduced in both feet, and no carotid bruits. 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   Her medications were just renewed so they were not renewed today. 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 Patient is to continue Vimpat 100 mg b.i.d. Followup in 6 months time or earlier as needed, and patient to call if any problem, and to get her test results    Signed By: Dayday Steele MD     October 29, 2018       This note will not be viewable in [address]

## 2018-10-29 NOTE — PATIENT INSTRUCTIONS

## 2018-11-02 ENCOUNTER — TELEPHONE (OUTPATIENT)
Dept: NEUROLOGY | Age: 83
End: 2018-11-02

## 2018-11-02 NOTE — TELEPHONE ENCOUNTER
----- Message from Arianne Palmoo sent at 11/2/2018 10:01 AM EDT -----  Regarding: Dr. Harrison Sorto  Pt is calling to find out when her EEG appt date and time is set for. [phone].

## 2018-11-05 DIAGNOSIS — E78.00 PURE HYPERCHOLESTEROLEMIA: ICD-10-CM

## 2018-11-05 RX ORDER — SIMVASTATIN 40 MG/1
TABLET, FILM COATED ORAL
Qty: 90 TAB | Refills: 1 | Status: SHIPPED | OUTPATIENT
Start: 2018-11-05 | End: 2019-03-04 | Stop reason: SDUPTHER

## 2018-11-06 ENCOUNTER — TELEPHONE (OUTPATIENT)
Dept: NEUROLOGY | Age: 83
End: 2018-11-06

## 2018-11-06 ENCOUNTER — DOCUMENTATION ONLY (OUTPATIENT)
Dept: NEUROLOGY | Age: 83
End: 2018-11-06

## 2018-11-06 NOTE — TELEPHONE ENCOUNTER
----- Message from Jillian Ackerman sent at 11/6/2018 10:01 AM EST -----  Regarding: Dr Dickerson/telephone   Pt (p) [phone],SRFEP like a return call back from the nurse regarding her EEG, she said she is already scheduled for her EEG and would like a return call back to let the nurse know she stopped taking the medication Escitalopram  Oxalate 5mg  because it made her very  very nauseated after taking 5 pills. Pt gave permission if she misses the nurses call.

## 2018-11-12 NOTE — TELEPHONE ENCOUNTER
Called and left message that I received her message and per Dr Lamin Correa, she should continue to stay off of the Lexapro as it has made her sick

## 2018-11-21 LAB — LACOSAMIDE SERPL-MCNC: 8.3 UG/ML (ref 5–10)

## 2018-11-27 ENCOUNTER — HOSPITAL ENCOUNTER (OUTPATIENT)
Dept: NEUROLOGY | Age: 83
Discharge: HOME | End: 2018-11-27
Attending: PSYCHIATRY & NEUROLOGY
Payer: MEDICARE

## 2018-11-27 DIAGNOSIS — G40.209 COMPLEX PARTIAL SEIZURE WITH IMPAIRMENT OF CONSCIOUSNESS (HCC): ICD-10-CM

## 2018-11-27 DIAGNOSIS — R41.82 ALTERED MENTAL STATUS, UNSPECIFIED ALTERED MENTAL STATUS TYPE: ICD-10-CM

## 2018-11-27 DIAGNOSIS — G45.1 TRANSIENT ISCHEMIC ATTACK INVOLVING LEFT INTERNAL CAROTID ARTERY: ICD-10-CM

## 2018-11-27 DIAGNOSIS — I67.89 CEREBRAL MICROVASCULAR DISEASE: ICD-10-CM

## 2018-11-27 PROCEDURE — 95953 NEURO EEG 24 HR: CPT

## 2018-11-29 PROBLEM — R41.82 ALTERED MENTAL STATUS, UNSPECIFIED: Status: ACTIVE | Noted: 2018-11-29

## 2018-11-30 NOTE — PROCEDURES
[address] [facility]  MR#: [id number]  : 1935  ACCOUNT #: [de-identified]   DATE OF SERVICE: 2018    CLINICAL INDICATION:  This is an ambulatory 24-hour EEG recording on patient for an 82-year-old female with a history of seizures. Patient on medication of Vimpat 100 mg b.i.d. Patient with previous seizures, possible left temporal abnormality. The patient wanted to decrease her medication. EEG to rule out recurrent seizures, rule out spike discharges, rule out cortical abnormality. Rule out seizures. INTERPRETATION:  This is an essentially normal, though technically somewhat difficult ambulatory 24-hour EEG prolonged recording for seizures, but showing no clear spike discharges, recorded spells or focal abnormalities. DESCRIPTION OF THE RECORD:  This is a 16-channel prolonged 24-hour EEG recording on patient for possible seizures and to consider tapering medications. This study also had continuous EKG monitoring throughout the study to rule out cardiac arrhythmias. During the study, the patient was hooked up at approximately 2018 at approximately 8:54 a.m. The patient was discontinued 2018 at approximately 9:55 a.m. for a total time on the study of 25 hours approximately. The study was technically adequate, but there was persistent and fairly frequent spells of muscle and movement and electrode artifact seen in the recording making interpretation somewhat difficult. However, there were no clear areas of focal slowing, no spike discharges, no recorded spells  electrographically on the EEG. The patient did have a posteriorly located occipital alpha rhythm of 9-10 Hz that did attenuate with eye opening in the awake state. The patient had frequent periods of drowsiness and did enter prolonged stage of sleep with K complexes and sleep spindles seen in the evening hours.   Neither hyperventilation nor photic stimulation were performed. INTERPRETATION:  This is an essentially normal ambulatory 24-hour EEG recording on the patient to rule out seizures showing no clear focal abnormalities,  no spike discharges and no recorded spells electrographically. Clinical correlation recommended.       MD MICHELE Renee / Alf Kruse  D: 11/29/2018 23:00     T: 11/30/2018 08:21  JOB #: [id number]  CC: Yanick Chappell MD

## 2019-02-04 ENCOUNTER — TELEPHONE (OUTPATIENT)
Dept: NEUROLOGY | Age: 84
End: 2019-02-04

## 2019-02-04 NOTE — TELEPHONE ENCOUNTER
----- Message from Otis Black sent at 2/1/2019  4:08 PM EST -----  Regarding: Dr. Nella Dutta [facility]) called asking for an additional diagnosis for pt.  Best contact [phone] [facility]#[id number]

## 2019-03-04 ENCOUNTER — OFFICE VISIT (OUTPATIENT)
Dept: INTERNAL MEDICINE CLINIC | Age: 84
End: 2019-03-04

## 2019-03-04 VITALS
RESPIRATION RATE: 18 BRPM | OXYGEN SATURATION: 97 % | DIASTOLIC BLOOD PRESSURE: 76 MMHG | HEART RATE: 68 BPM | HEIGHT: 67 IN | TEMPERATURE: 97.9 F | SYSTOLIC BLOOD PRESSURE: 134 MMHG | BODY MASS INDEX: 20.56 KG/M2 | WEIGHT: 131 LBS

## 2019-03-04 DIAGNOSIS — I65.23 STENOSIS OF BOTH CAROTID ARTERIES WITHOUT INFARCTION: ICD-10-CM

## 2019-03-04 DIAGNOSIS — Z23 ENCOUNTER FOR IMMUNIZATION: ICD-10-CM

## 2019-03-04 DIAGNOSIS — Z00.00 MEDICARE ANNUAL WELLNESS VISIT, SUBSEQUENT: Primary | ICD-10-CM

## 2019-03-04 DIAGNOSIS — G40.209 COMPLEX PARTIAL SEIZURE WITH IMPAIRMENT OF CONSCIOUSNESS (HCC): ICD-10-CM

## 2019-03-04 DIAGNOSIS — E78.00 PURE HYPERCHOLESTEROLEMIA: ICD-10-CM

## 2019-03-04 DIAGNOSIS — Z71.89 ACP (ADVANCE CARE PLANNING): ICD-10-CM

## 2019-03-04 DIAGNOSIS — J30.2 SEASONAL ALLERGIC RHINITIS, UNSPECIFIED TRIGGER: ICD-10-CM

## 2019-03-04 DIAGNOSIS — Z13.39 SCREENING FOR ALCOHOLISM: ICD-10-CM

## 2019-03-04 DIAGNOSIS — F32.9 MAJOR DEPRESSIVE DISORDER WITH SINGLE EPISODE, REMISSION STATUS UNSPECIFIED: ICD-10-CM

## 2019-03-04 DIAGNOSIS — M81.0 AGE-RELATED OSTEOPOROSIS WITHOUT CURRENT PATHOLOGICAL FRACTURE: ICD-10-CM

## 2019-03-04 PROBLEM — R41.82 ALTERED MENTAL STATUS, UNSPECIFIED: Status: RESOLVED | Noted: 2018-11-29 | Resolved: 2019-03-04

## 2019-03-04 RX ORDER — SIMVASTATIN 40 MG/1
TABLET, FILM COATED ORAL
Qty: 90 TAB | Refills: 1 | Status: SHIPPED | OUTPATIENT
Start: 2019-03-04 | End: 2019-11-26 | Stop reason: SDUPTHER

## 2019-03-04 RX ORDER — MONTELUKAST SODIUM 10 MG/1
TABLET ORAL
Qty: 90 TAB | Refills: 1 | Status: SHIPPED | OUTPATIENT
Start: 2019-03-04 | End: 2019-10-09 | Stop reason: SDUPTHER

## 2019-03-04 NOTE — PROGRESS NOTES
Shailesh Chaney is a 82 y.o. female who was seen in clinic today (3/4/2019) for a full physical.        Assessment & Plan:   Diagnoses and all orders for this visit:    1. Medicare annual wellness visit, subsequent    2. ACP (advance care planning)    3. Encounter for immunization    4. Screening for alcoholism  -     RI ANNUAL ALCOHOL SCREEN 15 MIN    5. Major depressive disorder with single episode, remission status unspecified- improved, continue current meds per specialist, I will take over if neurology wants. 6. Age-related osteoporosis without current pathological fracture- unclear control, improved per last OBGYN note, will get DEXA, will defer to specialist    7. Pure hypercholesterolemia- well controlled, continue current treatment pending review of labs   -     simvastatin (ZOCOR) 40 mg tablet; TAKE 1 TABLET NIGHTLY  -     METABOLIC PANEL, COMPREHENSIVE  -     CBC W/O DIFF  -     LIPID PANEL    8. Seasonal allergic rhinitis, unspecified trigger- improved slightly, reviewed pros/cons to meds, will refill but did review trying to stop to see if it is actually helping.  -     montelukast (SINGULAIR) 10 mg tablet; TAKE 1 TABLET DAILY    9. Complex partial seizure with impairment of consciousness ([facility] [address]- stable, asymptomatic, defer to specialist      10. 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         Follow-up Disposition:  Return in about 1 year (around 3/4/2020), or if symptoms worsen or fail to improve.        ------------------------------------------------------------------------------------------    Subjective: Annual Wellness Visit- Subsequent Visit    End of Life Planning: This was discussed with her today and she has an advanced directive - a copy has been provided. Reviewed DNR/DNI and patient is interested in remaining a DNR, no changes made.       Depression Screen:  3 most recent PHQ Screens 3/4/2019   Little interest or pleasure in doing things Not at all   Feeling down, depressed, irritable, or hopeless Not at all   Total Score PHQ 2 0      **Started on Lexapro by neurologist at her last appointment. Fall Risk:   Fall Risk Assessment, last 12 mths 3/4/2019   Able to walk? Yes   Fall in past 12 months? No   Fall with injury? -   Number of falls in past 12 months -   Fall Risk Score -       Abuse Screen:  Abuse Screening Questionnaire 3/4/2019   Do you ever feel afraid of your partner? N   Are you in a relationship with someone who physically or mentally threatens you? N   Is it safe for you to go home? Y         Alcohol Risk Factor Screening: On any occasion during the past 3 months, have you had more than 3 drinks containing alcohol? No  Do you average more than 7 drinks per week? No    Hearing Loss: Hearing has decreased, needs further evaluation, she will f/u with ENT (Dr Selene Degroot). Cognition Screen:  Has your family/caregiver stated any concerns about your memory: no  Cognition: appears appropriate for age attention/concentration and appears appropriate safety awareness    Activities of Daily Living:    Requires assistance with: no ADLs  Home contains: handrails and grab bars  Exercise: very active    Adult Nutrition Screen:  No risk factors noted. Health Maintenance  Daily Aspirin: no  Bone Density: UTD, not on file, OBGYN notes reviewed & comment on improved DEXA, will attempt to get actual imaging for chart  Glaucoma Screening: UTD    Immunizations:    Influenza: up to date. Tetanus: up to date. Shingles: up to date. Pneumonia: up to date. Cancer screening:    Cervical: reviewed guidelines, n/a. Breast: reviewed guidelines, she reports getting done year (no records available). Colon: reviewed guidelines, up to date.        Patient Care Team:  Lennox Shah MD as PCP - General (Internal Medicine)  Onelia Demarco MD (Gynecology)  Tara Marquis MD (Neurology)  Breana Marcelino MD (Dermatology)  Joanna Maloney MD (Dermatology)  Jaylen Quinteros DO (Ophthalmology)       The following sections were reviewed & updated as appropriate: PMH, PSH, FH, and SH. Patient Active Problem List   Diagnosis Code    GERD (gastroesophageal reflux disease) K21.9    Allergic rhinitis J30.9    Osteoporosis M81.0    Family history of early CAD Z80.55    Multiple thyroid nodules E04.2    Hyperlipidemia E78.5    Complex partial seizure with impairment of consciousness ([facility] [address] G40.209    Dizziness R42    Stenosis of both carotid arteries without infarction I65.23    Cerebral microvascular disease I67.9    Transient ischemic attack involving left internal carotid artery G45.1          Prior to Admission medications    Medication Sig Start Date End Date Taking? Authorizing Provider   simvastatin (ZOCOR) 40 mg tablet TAKE 1 TABLET NIGHTLY 11/5/18  Yes Mackenzie Tapia MD   lacosamide (VIMPAT) 100 mg tab tablet Take 1 Tab by mouth two (2) times a day. Max Daily Amount: 200 mg. 10/29/18  Yes José Miguel Lomax MD   escitalopram oxalate (LEXAPRO) 5 mg tablet Take 1 Tab by mouth daily. 10/29/18  Yes José Miguel Lomax MD   montelukast (SINGULAIR) 10 mg tablet TAKE 1 TABLET DAILY 3/4/18  Yes Mackenzie Tapia MD   CHOLECALCIFEROL, VITAMIN D3, (VITAMIN D3 PO) Take  by mouth. Yes Provider, Historical   Denosumab (PROLIA) 60 mg/mL injection 60 mg by SubCUTAneous route. Every 6 months   Yes Provider, Historical   calcium-vitamin D (CALCIUM 500+D) 500 mg(1,250mg) -200 unit per tablet Take 1 Tab by mouth two (2) times daily (with meals). Yes Provider, Historical          Allergies   Allergen Reactions    Fosamax [Alendronate] Other (comments)     GERD    Keppra [Levetiracetam] Other (comments)     \"IT DIDN'T WORK\"    Lyrica [Pregabalin] Unknown (comments)    Pravastatin Other (comments)     Leg pain              Review of Systems   Constitutional: Negative for chills and fever.    Respiratory: Negative for cough and shortness of breath. Cardiovascular: Negative for chest pain and palpitations. Gastrointestinal: Negative for abdominal pain, blood in stool, constipation, diarrhea, heartburn, nausea and vomiting. Musculoskeletal: Negative for joint pain and myalgias. Neurological: Negative for tingling, focal weakness and headaches. Endo/Heme/Allergies: Does not bruise/bleed easily. Psychiatric/Behavioral: Negative for depression. The patient is not nervous/anxious and does not have insomnia. She reports has been under a lot of stress recently,  had a fall and multiple broken bones. She was started on meds in Oct and she thinks it is helping. No side effects         Objective:   Physical Exam   Constitutional: She appears well-developed. No distress. HENT:   Mouth/Throat: Mucous membranes are normal.   Eyes: Conjunctivae and lids are normal. No scleral icterus. Neck: Neck supple. No thyromegaly present. Cardiovascular: Regular rhythm and normal heart sounds. No murmur heard. Pulmonary/Chest: Effort normal and breath sounds normal. She has no wheezes. She has no rales. Abdominal: Soft. Bowel sounds are normal. She exhibits no mass. There is no hepatosplenomegaly. There is no tenderness. Musculoskeletal: She exhibits no edema. Lymphadenopathy:     She has no cervical adenopathy. Skin: No rash noted. Psychiatric: She has a normal mood and affect. Her behavior is normal.          Visit Vitals  /76   Pulse 68   Temp 97.9 °F (36.6 °C) (Oral)   Resp 18   Ht 5' 7.35\" (1.711 m)   Wt 131 lb (59.4 kg)   SpO2 97%   BMI 20.30 kg/m²          Advised her to call back or return to office if symptoms worsen/change/persist.  Discussed expected course/resolution/complications of diagnosis in detail with patient. Medication risks/benefits/costs/interactions/alternatives discussed with patient. She was given an after visit summary which includes diagnoses, current medications, & vitals.   She expressed understanding with the diagnosis and plan. Aspects of this note may have been generated using voice recognition software. Despite editing, there may be some syntax errors.        Geovany Llanos MD

## 2019-03-04 NOTE — ACP (ADVANCE CARE PLANNING)
Advance Care Planning (ACP) Provider Note - Comprehensive     Date of ACP Conversation: 03/04/19  Persons included in Conversation:  patient  Length of ACP Conversation in minutes: <16 minutes (Non-Billable)    Authorized Decision Maker (if patient is incapable of making informed decisions):   Healthcare Agent/Medical Power of  under Advance Directive      She has an advanced directive - a copy has been provided & still reflects her wishes. Reviewed DNR/DNI and patient is interested in remaining a DNR, no changes made. General ACP for ALL Patients with Decision Making Capacity:  Importance of advance care planning, including choosing a healthcare agent to communicate patient's healthcare decisions if patient lost the ability to make decisions, such as after a sudden illness or accident  Understanding of the healthcare agent role was assessed and information provided  Opportunity offered to explore how cultural, Hindu, spiritual, or personal beliefs would affect decisions for future care  Exploration of values, goals, and preferences if recovery is not expected, even with continued medical treatment in the event of: Imminent death or severe, permanent brain injury    For Serious or Chronic Illness:  Understanding of CPR, goals and expected outcomes, benefits and burdens discussed.   Understanding of medical condition  Information on CPR success rates provided (e.g. for CPR in hospital, survival to d/c at two weeks is 22%, for chronically ill or elderly/frail survival is less than 3%)    Interventions Provided:  Recommended communicating the plan and making copies for the healthcare agent, personal physician, and others as appropriate (e.g., health system)  Recommended review of completed ACP document annually or upon change in health status

## 2019-03-04 NOTE — PATIENT INSTRUCTIONS
Hearing Evaluations: [facility]   At [address]  [phone]    [facility] (off [address])  [place] ([address] [facility] [address])  [place] ([address])  [place] (off [address])      [address] [facility]   [email or web address]    [phone] --> [address] [facility]  [phone] --> [address]  ([place])  [phone] --> [address]  ([facility])      [address]  [place]    [phone]  --> [address], [facility]. [place], [address]          Medicare Wellness Visit, Female     The best way to live healthy is to have a lifestyle where you eat a well-balanced diet, exercise regularly, limit alcohol use, and quit all forms of tobacco/nicotine, if applicable. Regular preventive services are another way to keep healthy. Preventive services (vaccines, screening tests, monitoring & exams) can help personalize your care plan, which helps you manage your own care. Screening tests can find health problems at the earliest stages, when they are easiest to treat. [facility] follows the current, evidence-based guidelines published by the [place] Rogelio David (USPSTF) when recommending preventive services for our patients. Because we follow these guidelines, sometimes recommendations change over time as research supports it. (For example, mammograms used to be recommended annually. Even though Medicare will still pay for an annual mammogram, the newer guidelines recommend a mammogram every two years for women of average risk.)  Of course, you and your doctor may decide to screen more often for some diseases, based on your risk and your health status. Preventive services for you include:  - Medicare offers their members a free annual wellness visit, which is time for you and your primary care provider to discuss and plan for your preventive service needs.  Take advantage of this benefit every year!  -All adults over the age of 72 should receive the recommended pneumonia vaccines. Current USPSTF guidelines recommend a series of two vaccines for the best pneumonia protection.   -All adults should have a flu vaccine yearly and a tetanus vaccine every 10 years. All adults age 61 and older should receive a shingles vaccine once in their lifetime.    -A bone mass density test is recommended when a woman turns 65 to screen for osteoporosis. This test is only recommended one time, as a screening. Some providers will use this same test as a disease monitoring tool if you already have osteoporosis. -All adults age 38-68 who are overweight should have a diabetes screening test once every three years.   -Other screening tests and preventive services for persons with diabetes include: an eye exam to screen for diabetic retinopathy, a kidney function test, a foot exam, and stricter control over your cholesterol.   -Cardiovascular screening for adults with routine risk involves an electrocardiogram (ECG) at intervals determined by your doctor.   -Colorectal cancer screenings should be done for adults age 54-65 with no increased risk factors for colorectal cancer. There are a number of acceptable methods of screening for this type of cancer. Each test has its own benefits and drawbacks. Discuss with your doctor what is most appropriate for you during your annual wellness visit. The different tests include: colonoscopy (considered the best screening method), a fecal occult blood test, a fecal DNA test, and sigmoidoscopy. -Breast cancer screenings are recommended every other year for women of normal risk, age 54-69.  -Cervical cancer screenings for women over age 72 are only recommended with certain risk factors.   -All adults born between [facility] [place] should be screened once for Hepatitis C.      Here is a list of your current Health Maintenance items (your personalized list of preventive services) with a due date:  Health Maintenance Due   Topic Date Due    Annual Well Visit  03/02/2019              Learning About Living Denny  What is a living will? A living will is a legal form you use to write down the kind of care you want at the end of your life. It is used by the health professionals who will treat you if you aren't able to decide for yourself. If you put your wishes in writing, your loved ones and others will know what kind of care you want. They won't need to guess. This can ease your mind and be helpful to others. A living will is not the same as an estate or property will. An estate will explains what you want to happen with your money and property after you die. Is a living will a legal document? A living will is a legal document. Each state has its own laws about living min. If you move to another state, make sure that your living will is legal in the state where you now live. Or you might use a universal form that has been approved by many states. This kind of form can sometimes be completed and stored online. Your electronic copy will then be available wherever you have a connection to the Internet. In most cases, doctors will respect your wishes even if you have a form from a different state. · You don't need an  to complete a living will. But legal advice can be helpful if your state's laws are unclear, your health history is complicated, or your family can't agree on what should be in your living will. · You can change your living will at any time. Some people find that their wishes about end-of-life care change as their health changes. · In addition to making a living will, think about completing a medical power of  form. This form lets you name the person you want to make end-of-life treatment decisions for you (your \"health care agent\") if you're not able to.  Many hospitals and nursing homes will give you the forms you need to complete a living will and a medical power of . · Your living will is used only if you can't make or communicate decisions for yourself anymore. If you become able to make decisions again, you can accept or refuse any treatment, no matter what you wrote in your living will. · Your state may offer an online registry. This is a place where you can store your living will online so the doctors and nurses who need to treat you can find it right away. What should you think about when creating a living will? Talk about your end-of-life wishes with your family members and your doctor. Let them know what you want. That way the people making decisions for you won't be surprised by your choices. Think about these questions as you make your living will:  · Do you know enough about life support methods that might be used? If not, talk to your doctor so you know what might be done if you can't breathe on your own, your heart stops, or you're unable to swallow. · What things would you still want to be able to do after you receive life-support methods? Would you want to be able to walk? To speak? To eat on your own? To live without the help of machines? · If you have a choice, where do you want to be cared for? In your home? At a hospital or nursing home? · Do you want certain Spiritism practices performed if you become very ill? · If you have a choice at the end of your life, where would you prefer to die? At home? In a hospital or nursing home? Somewhere else? · Would you prefer to be buried or cremated? · Do you want your organs to be donated after you die? What should you do with your living will? · Make sure that your family members and your health care agent have copies of your living will. · Give your doctor a copy of your living will to keep in your medical record. If you have more than one doctor, make sure that each one has a copy. · You may want to put a copy of your living will where it can be easily found. Where can you learn more?   Go to http://tanna-leonel.info/. Enter Z958 in the search box to learn more about \"Learning About Living Perroy. \"  Current as of: August 8, 2016  Content Version: 11.3  © 5639-9399 [employer]. Care instructions adapted under license by [employer] (which disclaims liability or warranty for this information). If you have questions about a medical condition or this instruction, always ask your healthcare professional. [place] [address] any warranty or liability for your use of this information.

## 2019-03-12 ENCOUNTER — HOSPITAL ENCOUNTER (OUTPATIENT)
Dept: LAB | Age: 84
Discharge: HOME | End: 2019-03-12
Payer: MEDICARE

## 2019-03-12 PROCEDURE — 80053 COMPREHEN METABOLIC PANEL: CPT

## 2019-03-12 PROCEDURE — 85027 COMPLETE CBC AUTOMATED: CPT

## 2019-03-12 PROCEDURE — 80061 LIPID PANEL: CPT

## 2019-03-13 LAB
ALBUMIN SERPL-MCNC: 4.1 G/DL (ref 3.5–4.7)
ALBUMIN/GLOB SERPL: 1.5 {RATIO} (ref 1.2–2.2)
ALP SERPL-CCNC: 96 IU/L (ref 39–117)
ALT SERPL-CCNC: 13 IU/L (ref 0–32)
AST SERPL-CCNC: 22 IU/L (ref 0–40)
BILIRUB SERPL-MCNC: 0.3 MG/DL (ref 0–1.2)
BUN SERPL-MCNC: 14 MG/DL (ref 8–27)
BUN/CREAT SERPL: 19 (ref 12–28)
CALCIUM SERPL-MCNC: 9.4 MG/DL (ref 8.7–10.3)
CHLORIDE SERPL-SCNC: 105 MMOL/L (ref 96–106)
CHOLEST SERPL-MCNC: 147 MG/DL (ref 100–199)
CO2 SERPL-SCNC: 24 MMOL/L (ref 20–29)
CREAT SERPL-MCNC: 0.73 MG/DL (ref 0.57–1)
ERYTHROCYTE [DISTWIDTH] IN BLOOD BY AUTOMATED COUNT: 13.7 % (ref 12.3–15.4)
GLOBULIN SER CALC-MCNC: 2.7 G/DL (ref 1.5–4.5)
GLUCOSE SERPL-MCNC: 92 MG/DL (ref 65–99)
HCT VFR BLD AUTO: 42.9 % (ref 34–46.6)
HDLC SERPL-MCNC: 48 MG/DL
HGB BLD-MCNC: 14.4 G/DL (ref 11.1–15.9)
LDLC SERPL CALC-MCNC: 70 MG/DL (ref 0–99)
MCH RBC QN AUTO: 28.9 PG (ref 26.6–33)
MCHC RBC AUTO-ENTMCNC: 33.6 G/DL (ref 31.5–35.7)
MCV RBC AUTO: 86 FL (ref 79–97)
PLATELET # BLD AUTO: 292 X10E3/UL (ref 150–379)
POTASSIUM SERPL-SCNC: 4.2 MMOL/L (ref 3.5–5.2)
PROT SERPL-MCNC: 6.8 G/DL (ref 6–8.5)
RBC # BLD AUTO: 4.98 X10E6/UL (ref 3.77–5.28)
SODIUM SERPL-SCNC: 143 MMOL/L (ref 134–144)
TRIGL SERPL-MCNC: 146 MG/DL (ref 0–149)
VLDLC SERPL CALC-MCNC: 29 MG/DL (ref 5–40)
WBC # BLD AUTO: 5.3 X10E3/UL (ref 3.4–10.8)

## 2019-05-01 ENCOUNTER — TELEPHONE (OUTPATIENT)
Dept: INTERNAL MEDICINE CLINIC | Age: 84
End: 2019-05-01

## 2019-05-01 NOTE — TELEPHONE ENCOUNTER
Shil[address] (Self) [phone] (H)     Pt requesting something for her poison ivy be sent to [facility] in [place]. She cannot come due to her  recently having a stroke.

## 2019-05-01 NOTE — TELEPHONE ENCOUNTER
Pt. Has poison ivy face and BL arms since sun. From working in her yard. Pt. Has been using OTC lotion and benadryl. Pt. Cannot come in due to caring for her . Pt. Has [facility] coming to see her today.

## 2019-05-13 ENCOUNTER — TELEPHONE (OUTPATIENT)
Dept: NEUROLOGY | Age: 84
End: 2019-05-13

## 2019-05-13 DIAGNOSIS — R41.82 ALTERED MENTAL STATUS, UNSPECIFIED ALTERED MENTAL STATUS TYPE: Primary | ICD-10-CM

## 2019-05-13 RX ORDER — ESCITALOPRAM OXALATE 10 MG/1
10 TABLET ORAL DAILY
Qty: 30 TAB | Refills: 5 | Status: SHIPPED | OUTPATIENT
Start: 2019-05-13 | End: 2019-11-18 | Stop reason: SDUPTHER

## 2019-05-13 NOTE — TELEPHONE ENCOUNTER
Patient had to cancel her appt for today because her  fell. Needs a stronger dose on her escitalopram oxalate 5 mg.

## 2019-07-16 DIAGNOSIS — G40.209 COMPLEX PARTIAL SEIZURE WITH IMPAIRMENT OF CONSCIOUSNESS (HCC): ICD-10-CM

## 2019-07-16 DIAGNOSIS — I65.23 STENOSIS OF BOTH CAROTID ARTERIES WITHOUT INFARCTION: ICD-10-CM

## 2019-07-16 DIAGNOSIS — I67.89 CEREBRAL MICROVASCULAR DISEASE: ICD-10-CM

## 2019-07-16 DIAGNOSIS — G45.1 TRANSIENT ISCHEMIC ATTACK INVOLVING LEFT INTERNAL CAROTID ARTERY: ICD-10-CM

## 2019-07-16 DIAGNOSIS — R42 DIZZINESS AND GIDDINESS: Chronic | ICD-10-CM

## 2019-07-16 RX ORDER — LACOSAMIDE 100 MG/1
100 TABLET ORAL 2 TIMES DAILY
Qty: 180 TAB | Refills: 4 | Status: SHIPPED | OUTPATIENT
Start: 2019-07-16 | End: 2020-01-06 | Stop reason: SDUPTHER

## 2019-07-16 NOTE — TELEPHONE ENCOUNTER
Patient called to request a refill on her Vimpat. She would like for the rx to go to [facility].       [phone]

## 2019-07-16 NOTE — TELEPHONE ENCOUNTER
Future Appointments   Date Time Provider Abraham Robles   1/6/2020 11:40 AM Florence Michelle  [facility]   3/2/2020 10:30 AM [place] Hilton [place] [address] [facility]                         Last Appointment My Department:  10/29/18    Please advise of refill below. Requested Prescriptions     Pending Prescriptions Disp Refills    lacosamide (VIMPAT) 100 mg tab tablet 180 Tab 4     Sig: Take 1 Tab by mouth two (2) times a day. Max Daily Amount: 200 mg.

## 2019-10-09 DIAGNOSIS — J30.2 SEASONAL ALLERGIC RHINITIS, UNSPECIFIED TRIGGER: ICD-10-CM

## 2019-10-09 RX ORDER — MONTELUKAST SODIUM 10 MG/1
TABLET ORAL
Qty: 90 TAB | Refills: 1 | Status: SHIPPED | OUTPATIENT
Start: 2019-10-09 | End: 2020-04-12

## 2019-10-09 NOTE — TELEPHONE ENCOUNTER
Requested Prescriptions     Pending Prescriptions Disp Refills    montelukast (SINGULAIR) 10 mg tablet 90 Tab 1     Sig: TAKE 1 TABLET DAILY

## 2019-11-18 ENCOUNTER — TELEPHONE (OUTPATIENT)
Dept: NEUROLOGY | Age: 84
End: 2019-11-18

## 2019-11-18 DIAGNOSIS — R41.82 ALTERED MENTAL STATUS, UNSPECIFIED ALTERED MENTAL STATUS TYPE: ICD-10-CM

## 2019-11-18 RX ORDER — ESCITALOPRAM OXALATE 10 MG/1
10 TABLET ORAL DAILY
Qty: 30 TAB | Refills: 5 | Status: SHIPPED | OUTPATIENT
Start: 2019-11-18 | End: 2020-01-06 | Stop reason: SDUPTHER

## 2019-11-18 NOTE — TELEPHONE ENCOUNTER
Future Appointments   Date Time Provider Abraham Robles   1/6/2020 11:40 AM Andra Bay  [facility]   3/2/2020 10:30 AM Pearl Duong [place] [address] [facility]                         Last Appointment My Department:  10/29/18    Please advise of refill below. Requested Prescriptions     Pending Prescriptions Disp Refills    escitalopram oxalate (LEXAPRO) 10 mg tablet 30 Tab 5     Sig: Take 1 Tab by mouth daily.

## 2019-11-18 NOTE — TELEPHONE ENCOUNTER
----- Message from Crispin Moore sent at 11/18/2019  9:05 AM EST -----  Regarding: Dr. Denise Roa  Medication Refill    Caller (if not patient):      Relationship of caller (if not patient):      Best contact number(s): [phone]      Name of medication and dosage if known: escitalopram oxalate 10 mg      Is patient out of this medication (yes/no): yes      Pharmacy name: [address] listed in chart? (yes/no): yes  Pharmacy phone number:      Details to clarify the request:      Crispin Moore

## 2019-11-26 DIAGNOSIS — E78.00 PURE HYPERCHOLESTEROLEMIA: ICD-10-CM

## 2019-11-26 RX ORDER — SIMVASTATIN 40 MG/1
TABLET, FILM COATED ORAL
Qty: 90 TAB | Refills: 0 | Status: SHIPPED | OUTPATIENT
Start: 2019-11-26 | End: 2020-02-25 | Stop reason: SDUPTHER

## 2020-01-06 ENCOUNTER — OFFICE VISIT (OUTPATIENT)
Dept: NEUROLOGY | Age: 85
End: 2020-01-06

## 2020-01-06 VITALS
WEIGHT: 143 LBS | SYSTOLIC BLOOD PRESSURE: 148 MMHG | DIASTOLIC BLOOD PRESSURE: 66 MMHG | BODY MASS INDEX: 22.44 KG/M2 | OXYGEN SATURATION: 99 % | RESPIRATION RATE: 16 BRPM | HEIGHT: 67 IN | HEART RATE: 68 BPM

## 2020-01-06 DIAGNOSIS — G45.1 TRANSIENT ISCHEMIC ATTACK INVOLVING LEFT INTERNAL CAROTID ARTERY: ICD-10-CM

## 2020-01-06 DIAGNOSIS — R42 DIZZINESS AND GIDDINESS: ICD-10-CM

## 2020-01-06 DIAGNOSIS — R41.82 ALTERED MENTAL STATUS, UNSPECIFIED ALTERED MENTAL STATUS TYPE: Primary | ICD-10-CM

## 2020-01-06 DIAGNOSIS — G40.209 COMPLEX PARTIAL SEIZURE WITH IMPAIRMENT OF CONSCIOUSNESS (HCC): ICD-10-CM

## 2020-01-06 DIAGNOSIS — I67.89 CEREBRAL MICROVASCULAR DISEASE: ICD-10-CM

## 2020-01-06 DIAGNOSIS — I65.23 STENOSIS OF BOTH CAROTID ARTERIES WITHOUT INFARCTION: ICD-10-CM

## 2020-01-06 RX ORDER — LACOSAMIDE 100 MG/1
100 TABLET ORAL 2 TIMES DAILY
Qty: 180 TAB | Refills: 4 | Status: SHIPPED | OUTPATIENT
Start: 2020-01-06 | End: 2020-08-04 | Stop reason: SDUPTHER

## 2020-01-06 RX ORDER — ESCITALOPRAM OXALATE 10 MG/1
10 TABLET ORAL DAILY
Qty: 90 TAB | Refills: 5 | Status: SHIPPED | OUTPATIENT
Start: 2020-01-06 | End: 2021-09-26

## 2020-01-06 NOTE — LETTER
1/6/20 Patient: Dimitris Wellington YOB: 1935 Date of Visit: 1/6/2020 MD Nuvia Mora  [address] [place] [id number] VIA In Basket Dear Abida Catherine MD, Thank you for referring Ms. Dimitris Wellington to [address] [place] [address] for evaluation. My notes for this consultation are attached. Consult Subjective:  
 
Dimitris Wellington is a 82 y. o.right-handed  female seen today for evaluation at the request of Dr. Trevor Coburn of new complaint of wanting to know the results of her last EEG and discussed whether she could come off her medication, but she is had no seizures since her last visit and is doing well, and the EEG done 1 year ago was normal at 24 hours outpatient amatory study. We discussed the pros and cons of coming off the medication the patient decided she wants to keep on the medication she is doing well, no seizures, no side effects, she has gained some weight, but her irritability is markedly improved on the Lexapro she wants to continue that so her Vimpat and her Lexapro are both renewed for the patient today. She is had no new neurologic problems either, no focal weakness, just the weight gain a little bit, and advised her to try to exercise more watch her diet, could possibly be from the Lexapro but usually that is weight neutral, we will see how she does. She is currently on Vimpat 100 mg twice a day tolerating it well. She has had no side effects on the medication, and is tolerating it well otherwise, and still able to get it from her mail order pharmacy at a reasonable price. She's had no falls, no new numbness or weakness in her arms and legs, no headache, no visual problems, no other bulbar symptoms, or neck pain or back pain. She does try to exercise regularly, and does take vitamins. Her D61 and folic acid levels were normal in 2012. She has no signs of neuropathy.  She actually walks fairly well and has only a very mild difficulty with tandem walking, but has a negative Romberg. She had EEGs in the past that showed a left temporal abnormality. She  Is thought to have complex partial seizures. 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 Her Dopplers done 2 year ago have shown mild less than 50% disease bilaterally. She will continue a baby aspirin for her microvascular disease, but she has had no strokelike symptoms A complete review of systems in symptoms was negative for any other new medical problems, complications or illnesses. She has a past history of goiter, allergies, osteoporosis, SVT, DJD, GERD, reduced hearing, hyperlipidemia, hysterectomy, detached retina repair, colonoscopy, rectocele surgery esophageal dilatation and lip surgery for squamous cell cancer. Past Medical History:  
Diagnosis Date  Allergic rhinitis  Arthritis  Combined forms of age-related cataract of left eye 8/26/2016 [facility] IOL OS  
 Combined forms of age-related cataract of right eye 8/26/2016 KPE IOL OD  
 GERD (gastroesophageal reflux disease)  Goiter   
 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   
 last seizure 2015  SVT (supraventricular tachycardia) ([address]   
 as of 10/14/14 pt unaware she has ever had this and does not see a cardiologist  
  
Past Surgical History:  
Procedure Laterality Date  HX CATARACT REMOVAL Right 08/31/2016  HX CATARACT REMOVAL Left 09/14/2016  HX COLONOSCOPY  10/7/2010  HX ENDOSCOPY  10/7/2010  HX HYSTERECTOMY  1970's [place] 1974 - ovaries in,vaginal repair [address] [facility] PROCEDURES  2012 7/12- bx of right lower lip, 10/12- s/p mohs for SCC  HX RETINAL DETACHMENT REPAIR Left detached retina, with laser repair  REPAIR OF RECTOCELE    
 surgery for rectocele and prolapse  - Dr Emil Turk Family History Problem Relation Age of Onset  Heart Disease Mother  Cancer Father   
     lung  Cancer Sister   
     colon  No Known Problems Son  No Known Problems Daughter  Anesth Problems Neg Hx Social History Tobacco Use  Smoking status: Former Smoker Packs/day: 0.50 Years: 20.00 Pack years: 10.00 Types: Cigarettes Last attempt to quit: 10/30/1969 Years since quittin.2  Smokeless tobacco: Never Used Substance Use Topics  Alcohol use: No  
  Frequency: Never Binge frequency: Never Current Outpatient Medications Medication Sig Dispense Refill  lacosamide (VIMPAT) 100 mg tab tablet Take 1 Tab by mouth two (2) times a day. Max Daily Amount: 200 mg. 180 Tab 4  
 escitalopram oxalate (LEXAPRO) 10 mg tablet Take 1 Tab by mouth daily. 90 Tab 5  
 simvastatin (ZOCOR) 40 mg tablet TAKE 1 TABLET NIGHTLY 90 Tab 0  
 montelukast (SINGULAIR) 10 mg tablet TAKE 1 TABLET DAILY 90 Tab 1  
 CHOLECALCIFEROL, VITAMIN D3, (VITAMIN D3 PO) Take  by mouth.  Denosumab (PROLIA) 60 mg/mL injection 60 mg by SubCUTAneous route. Every 6 months  calcium-vitamin D (CALCIUM 500+D) 500 mg(1,250mg) -200 unit per tablet Take 1 Tab by mouth two (2) times daily (with meals). Allergies Allergen Reactions  Fosamax [Alendronate] Other (comments) GERD  Keppra [Levetiracetam] Other (comments) \"IT DIDN'T WORK\"  Lyrica [Pregabalin] Unknown (comments)  Pravastatin Other (comments) Leg pain Review of Systems: A comprehensive review of systems was negative except for: Ears, nose, mouth, throat, and face: positive for hearing loss and sore mouth Musculoskeletal: positive for myalgias, arthralgias and stiff joints Neurological: positive for dizziness, seizures, coordination problems and gait problems Objective: I 
 
 
NEUROLOGICAL EXAM: 
 
Appearance: The patient is well developed, well nourished, provides a coherent history and is in no acute distress. Mental Status: Oriented to time, place and person, and the president, fund of knowledge and cognitive function seems normal, speech is without aphasia or dysarthria. Mood and affect appropriate but slightly anxious and depressed. Cranial Nerves:   Intact visual fields. Fundi are benign, but poorly seen. JUSTIN, EOM's full, no nystagmus, no ptosis. Facial sensation is normal. Corneal reflexes are not tested. Facial movement is symmetric. Hearing is reduced bilaterally. Palate is midline with normal sternocleidomastoid and trapezius muscles are normal. Tongue is midline. Neck without meningismus or bruits Temporal arteries are not tender or enlarged Motor:  5/5 strength in upper and lower proximal and distal muscles. Normal bulk and tone. No fasciculations. Rapid alternating movement is symmetric and essentially normal  
Reflexes:   Deep tendon reflexes 2+/4 and symmetrical. No Babinski or clonus present Sensory:   Normal to touch, pinprick and DSS, and vibration mildly decreased in both feet. Gait:  Normal gait. Tremor:   No tremor noted. Cerebellar:  No cerebellar signs present. The patient has slight difficulty doing tandem walking, but has a negative Romberg finger-nose-finger examination shows no tremor Neurovascular:  Normal heart sounds and regular rhythm, peripheral pulses mildly reduced in both feet, and no carotid bruits.   
 
 
 
 
Assessment:  
 
Plan:  
 
Patient with new problem of wanting to discuss her medications, and after long discussion she decided that she will stay on her medications, her irritability is better on the Lexapro she will continue that, I told her I did not think the medications are causing the weight gain but try to exercise [facility] advised her to stay mentally and physically active and try to exercise 3 3 and half hours a week, and stay mentally active also. Her Vimpat was refilled for the patient today. Her agitation and irritability are better on Lexapro and she will continue that for now. Her medications were just renewed so they were not renewed today. 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 
Patient has relatively prominent microvascular disease on MRI Patient to continue taking her vitamins and vitamin D every day, start exercising more and see if she improves. Patient is to continue Vimpat 100 mg b.i.d. Followup in 12 months time or earlier as needed, and patient to call if any problem, and to get her test results Signed By: Swati Navarro MD   
 January 6, 2020 This note will not be viewable in [address] If you have questions, please do not hesitate to call me. I look forward to following your patient along with you. Sincerely, Swati Navarro MD

## 2020-01-06 NOTE — PATIENT INSTRUCTIONS

## 2020-01-06 NOTE — PROGRESS NOTES
Consult    Subjective:     Keeley Gonzalez is a 82 y. o.right-handed  female seen today for evaluation at the request of Dr. Loni Jones of new complaint of wanting to know the results of her last EEG and discussed whether she could come off her medication, but she is had no seizures since her last visit and is doing well, and the EEG done 1 year ago was normal at 24 hours outpatient amatory study. We discussed the pros and cons of coming off the medication the patient decided she wants to keep on the medication she is doing well, no seizures, no side effects, she has gained some weight, but her irritability is markedly improved on the Lexapro she wants to continue that so her Vimpat and her Lexapro are both renewed for the patient today. She is had no new neurologic problems either, no focal weakness, just the weight gain a little bit, and advised her to try to exercise more watch her diet, could possibly be from the Lexapro but usually that is weight neutral, we will see how she does. She is currently on Vimpat 100 mg twice a day tolerating it well. She has had no side effects on the medication, and is tolerating it well otherwise, and still able to get it from her mail order pharmacy at a reasonable price. She's had no falls, no new numbness or weakness in her arms and legs, no headache, no visual problems, no other bulbar symptoms, or neck pain or back pain. She does try to exercise regularly, and does take vitamins. Her B50 and folic acid levels were normal in 2012. She has no signs of neuropathy. She actually walks fairly well and has only a very mild difficulty with tandem walking, but has a negative Romberg. She had EEGs in the past that showed a left temporal abnormality. She  Is thought to have complex partial seizures. 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 Her Dopplers done 2 year ago have shown mild less than 50% disease bilaterally.   She will continue a baby aspirin for her microvascular disease, but she has had no strokelike symptoms    A complete review of systems in symptoms was negative for any other new medical problems, complications or illnesses. She has a past history of goiter, allergies, osteoporosis, SVT, DJD, GERD, reduced hearing, hyperlipidemia, hysterectomy, detached retina repair, colonoscopy, rectocele surgery esophageal dilatation and lip surgery for squamous cell cancer.     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    Right lower lip, s/p moh's surgery (10/30/12)     Seizures ([address]     last seizure 2015    SVT (supraventricular tachycardia) ([address]     as of 10/14/14 pt unaware she has ever had this and does not see a cardiologist      Past Surgical History:   Procedure Laterality Date    HX CATARACT REMOVAL Right 08/31/2016    HX CATARACT REMOVAL Left 09/14/2016    HX COLONOSCOPY  10/7/2010         HX ENDOSCOPY  10/7/2010         HX HYSTERECTOMY  1970's    [place] 1974 - ovaries in,vaginal repair    HX MOHS PROCEDURES  2012 7/12- bx of right lower lip, 10/12- s/p mohs for SCC    HX RETINAL DETACHMENT REPAIR Left     detached retina, with laser repair    REPAIR OF RECTOCELE      surgery for rectocele and prolapse 8/06 - Dr Genevive Riedel     Family History   Problem Relation Age of Onset    Heart Disease Mother     Cancer Father         lung    Cancer Sister         colon    No Known Problems Son     No Known Problems Daughter     Anesth Problems Neg Hx       Social History     Tobacco Use    Smoking status: Former Smoker Packs/day: 0.50     Years: 20.00     Pack years: 10.00     Types: Cigarettes     Last attempt to quit: 10/30/1969     Years since quittin.2    Smokeless tobacco: Never Used   Substance Use Topics    Alcohol use: No     Frequency: Never     Binge frequency: Never       Current Outpatient Medications   Medication Sig Dispense Refill    lacosamide (VIMPAT) 100 mg tab tablet Take 1 Tab by mouth two (2) times a day. Max Daily Amount: 200 mg. 180 Tab 4    escitalopram oxalate (LEXAPRO) 10 mg tablet Take 1 Tab by mouth daily. 90 Tab 5    simvastatin (ZOCOR) 40 mg tablet TAKE 1 TABLET NIGHTLY 90 Tab 0    montelukast (SINGULAIR) 10 mg tablet TAKE 1 TABLET DAILY 90 Tab 1    CHOLECALCIFEROL, VITAMIN D3, (VITAMIN D3 PO) Take  by mouth.  Denosumab (PROLIA) 60 mg/mL injection 60 mg by SubCUTAneous route. Every 6 months      calcium-vitamin D (CALCIUM 500+D) 500 mg(1,250mg) -200 unit per tablet Take 1 Tab by mouth two (2) times daily (with meals). Allergies   Allergen Reactions    Fosamax [Alendronate] Other (comments)     GERD    Keppra [Levetiracetam] Other (comments)     \"IT DIDN'T WORK\"    Lyrica [Pregabalin] Unknown (comments)    Pravastatin Other (comments)     Leg pain        Review of Systems:  A comprehensive review of systems was negative except for: Ears, nose, mouth, throat, and face: positive for hearing loss and sore mouth  Musculoskeletal: positive for myalgias, arthralgias and stiff joints  Neurological: positive for dizziness, seizures, coordination problems and gait problems     Objective:     I      NEUROLOGICAL EXAM:    Appearance: The patient is well developed, well nourished, provides a coherent history and is in no acute distress. Mental Status: Oriented to time, place and person, and the president, fund of knowledge and cognitive function seems normal, speech is without aphasia or dysarthria. Mood and affect appropriate but slightly anxious and depressed.    Cranial Nerves: Intact visual fields. Fundi are benign, but poorly seen. JUSTIN, EOM's full, no nystagmus, no ptosis. Facial sensation is normal. Corneal reflexes are not tested. Facial movement is symmetric. Hearing is reduced bilaterally. Palate is midline with normal sternocleidomastoid and trapezius muscles are normal. Tongue is midline. Neck without meningismus or bruits  Temporal arteries are not tender or enlarged   Motor:  5/5 strength in upper and lower proximal and distal muscles. Normal bulk and tone. No fasciculations. Rapid alternating movement is symmetric and essentially normal   Reflexes:   Deep tendon reflexes 2+/4 and symmetrical. No Babinski or clonus present   Sensory:   Normal to touch, pinprick and DSS, and vibration mildly decreased in both feet. Gait:  Normal gait. Tremor:   No tremor noted. Cerebellar:  No cerebellar signs present. The patient has slight difficulty doing tandem walking, but has a negative Romberg finger-nose-finger examination shows no tremor   Neurovascular:  Normal heart sounds and regular rhythm, peripheral pulses mildly reduced in both feet, and no carotid bruits. Assessment:     Plan:     Patient with new problem of wanting to discuss her medications, and after long discussion she decided that she will stay on her medications, her irritability is better on the Lexapro she will continue that, I told her I did not think the medications are causing the weight gain but try to exercise [facility] we advised her to stay mentally and physically active and try to exercise 3 3 and half hours a week, and stay mentally active also. Her Vimpat was refilled for the patient today. Her agitation and irritability are better on Lexapro and she will continue that for now. Her medications were just renewed so they were not renewed today.   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 Patient is to continue Vimpat 100 mg b.i.d. Followup in 12 months time or earlier as needed, and patient to call if any problem, and to get her test results    Signed By: Guanakito Granado MD     January 6, 2020         This note will not be viewable in [address]

## 2020-02-25 DIAGNOSIS — E78.00 PURE HYPERCHOLESTEROLEMIA: ICD-10-CM

## 2020-02-25 RX ORDER — SIMVASTATIN 40 MG/1
TABLET, FILM COATED ORAL
Qty: 90 TAB | Refills: 0 | Status: SHIPPED | OUTPATIENT
Start: 2020-02-25 | End: 2020-05-25

## 2020-03-02 ENCOUNTER — OFFICE VISIT (OUTPATIENT)
Dept: INTERNAL MEDICINE CLINIC | Age: 85
End: 2020-03-02

## 2020-03-02 VITALS
BODY MASS INDEX: 22.44 KG/M2 | HEART RATE: 79 BPM | HEIGHT: 67 IN | WEIGHT: 143 LBS | TEMPERATURE: 97.8 F | RESPIRATION RATE: 18 BRPM | DIASTOLIC BLOOD PRESSURE: 70 MMHG | OXYGEN SATURATION: 98 % | SYSTOLIC BLOOD PRESSURE: 142 MMHG

## 2020-03-02 DIAGNOSIS — J30.2 SEASONAL ALLERGIC RHINITIS, UNSPECIFIED TRIGGER: ICD-10-CM

## 2020-03-02 DIAGNOSIS — Z00.00 MEDICARE ANNUAL WELLNESS VISIT, SUBSEQUENT: Primary | ICD-10-CM

## 2020-03-02 DIAGNOSIS — K21.9 GASTROESOPHAGEAL REFLUX DISEASE WITHOUT ESOPHAGITIS: ICD-10-CM

## 2020-03-02 DIAGNOSIS — E78.00 PURE HYPERCHOLESTEROLEMIA: ICD-10-CM

## 2020-03-02 DIAGNOSIS — Z71.89 ADVANCED CARE PLANNING/COUNSELING DISCUSSION: ICD-10-CM

## 2020-03-02 DIAGNOSIS — F32.5 MAJOR DEPRESSIVE DISORDER WITH SINGLE EPISODE, IN FULL REMISSION (HCC): ICD-10-CM

## 2020-03-02 DIAGNOSIS — G40.209 COMPLEX PARTIAL SEIZURE WITH IMPAIRMENT OF CONSCIOUSNESS (HCC): ICD-10-CM

## 2020-03-02 PROBLEM — H93.13 TINNITUS OF BOTH EARS: Status: ACTIVE | Noted: 2020-03-02

## 2020-03-02 PROBLEM — R41.82 ALTERED MENTAL STATUS: Status: RESOLVED | Noted: 2020-01-06 | Resolved: 2020-03-02

## 2020-03-02 PROBLEM — R42 DIZZINESS AND GIDDINESS: Status: RESOLVED | Noted: 2020-01-06 | Resolved: 2020-03-02

## 2020-03-02 PROBLEM — F32.9 DEPRESSION, MAJOR, SINGLE EPISODE: Status: ACTIVE | Noted: 2020-03-02

## 2020-03-02 RX ORDER — OMEPRAZOLE 40 MG/1
40 CAPSULE, DELAYED RELEASE ORAL DAILY
Qty: 30 CAP | Refills: 0 | Status: SHIPPED | OUTPATIENT
Start: 2020-03-02 | End: 2020-09-25

## 2020-03-02 NOTE — PATIENT INSTRUCTIONS
Medicare Wellness Visit, Female     The best way to live healthy is to have a lifestyle where you eat a well-balanced diet, exercise regularly, limit alcohol use, and quit all forms of tobacco/nicotine, if applicable. Regular preventive services are another way to keep healthy. Preventive services (vaccines, screening tests, monitoring & exams) can help personalize your care plan, which helps you manage your own care. Screening tests can find health problems at the earliest stages, when they are easiest to treat. [facility] follows the current, evidence-based guidelines published by the [facility] Rogelio Hernandez ([facility]) when recommending preventive services for our patients. Because we follow these guidelines, sometimes recommendations change over time as research supports it. (For example, mammograms used to be recommended annually. Even though Medicare will still pay for an annual mammogram, the newer guidelines recommend a mammogram every two years for women of average risk). Of course, you and your doctor may decide to screen more often for some diseases, based on your risk and your co-morbidities (chronic disease you are already diagnosed with). Preventive services for you include:  - Medicare offers their members a free annual wellness visit, which is time for you and your primary care provider to discuss and plan for your preventive service needs. Take advantage of this benefit every year!  -All adults over the age of 72 should receive the recommended pneumonia vaccines. Current USPSTF guidelines recommend a series of two vaccines for the best pneumonia protection.   -All adults should have a flu vaccine yearly and a tetanus vaccine every 10 years.   -All adults age 48 and older should receive the shingles vaccines (series of two vaccines).       -All adults age 38-68 who are overweight should have a diabetes screening test once every three years.   -All adults born between [birth year] and 1963 should be screened once for Hepatitis C.  -Other screening tests and preventive services for persons with diabetes include: an eye exam to screen for diabetic retinopathy, a kidney function test, a foot exam, and stricter control over your cholesterol.   -Cardiovascular screening for adults with routine risk involves an electrocardiogram (ECG) at intervals determined by your doctor.   -Colorectal cancer screenings should be done for adults age 54-65 with no increased risk factors for colorectal cancer. There are a number of acceptable methods of screening for this type of cancer. Each test has its own benefits and drawbacks. Discuss with your doctor what is most appropriate for you during your annual wellness visit. The different tests include: colonoscopy (considered the best screening method), a fecal occult blood test, a fecal DNA test, and sigmoidoscopy.    -A bone mass density test is recommended when a woman turns 65 to screen for osteoporosis. This test is only recommended one time, as a screening. Some providers will use this same test as a disease monitoring tool if you already have osteoporosis. -Breast cancer screenings are recommended every other year for women of normal risk, age 54-69.  -Cervical cancer screenings for women over age 72 are only recommended with certain risk factors. Here is a list of your current Health Maintenance items (your personalized list of preventive services) with a due date:  Health Maintenance Due   Topic Date Due    Flu Vaccine  08/01/2019    Annual Well Visit  03/04/2020    Cholesterol Test   03/12/2020    Glaucoma Screening   04/19/2020              Learning About Living [place]  What is a living will? A living will is a legal form you use to write down the kind of care you want at the end of your life. It is used by the health professionals who will treat you if you aren't able to decide for yourself.   If you put your wishes in writing, your loved ones and others will know what kind of care you want. They won't need to guess. This can ease your mind and be helpful to others. A living will is not the same as an estate or property will. An estate will explains what you want to happen with your money and property after you die. Is a living will a legal document? A living will is a legal document. Each state has its own laws about living min. If you move to another state, make sure that your living will is legal in the state where you now live. Or you might use a universal form that has been approved by many states. This kind of form can sometimes be completed and stored online. Your electronic copy will then be available wherever you have a connection to the Internet. In most cases, doctors will respect your wishes even if you have a form from a different state. · You don't need an  to complete a living will. But legal advice can be helpful if your state's laws are unclear, your health history is complicated, or your family can't agree on what should be in your living will. · You can change your living will at any time. Some people find that their wishes about end-of-life care change as their health changes. · In addition to making a living will, think about completing a medical power of  form. This form lets you name the person you want to make end-of-life treatment decisions for you (your \"health care agent\") if you're not able to. Many hospitals and nursing homes will give you the forms you need to complete a living will and a medical power of . · Your living will is used only if you can't make or communicate decisions for yourself anymore. If you become able to make decisions again, you can accept or refuse any treatment, no matter what you wrote in your living will. · Your state may offer an online registry.  This is a place where you can store your living will online so the doctors and nurses who need to treat you can find it right away. What should you think about when creating a living will? Talk about your end-of-life wishes with your family members and your doctor. Let them know what you want. That way the people making decisions for you won't be surprised by your choices. Think about these questions as you make your living will:  · Do you know enough about life support methods that might be used? If not, talk to your doctor so you know what might be done if you can't breathe on your own, your heart stops, or you're unable to swallow. · What things would you still want to be able to do after you receive life-support methods? Would you want to be able to walk? To speak? To eat on your own? To live without the help of machines? · If you have a choice, where do you want to be cared for? In your home? At a hospital or nursing home? · Do you want certain Pent[employer]ostal practices performed if you become very ill? · If you have a choice at the end of your life, where would you prefer to die? At home? In a hospital or nursing home? Somewhere else? · Would you prefer to be buried or cremated? · Do you want your organs to be donated after you die? What should you do with your living will? · Make sure that your family members and your health care agent have copies of your living will. · Give your doctor a copy of your living will to keep in your medical record. If you have more than one doctor, make sure that each one has a copy. · You may want to put a copy of your living will where it can be easily found. Where can you learn more? Go to http://tanna-leonel.info/. Enter J590 in the search box to learn more about \"Learning About Living Noe Wright. \"  Current as of: August 8, 2016  Content Version: 11.3  © 2825-2160 [employer]. Care instructions adapted under license by [employer] (which disclaims liability or warranty for this information).  If you have questions about a medical condition or this instruction, always ask your healthcare professional. [place] [address] any warranty or liability for your use of this information.

## 2020-03-02 NOTE — PROGRESS NOTES
Vito Torres is a 82 y.o. female who was seen in clinic today (3/2/2020) for a full physical.           Assessment & Plan:   Diagnoses and all orders for this visit:    1. Medicare annual wellness visit, subsequent    2. Advanced care planning/counseling discussion    3. Pure hypercholesterolemia- well controlled, continue current treatment pending review of labs   -     METABOLIC PANEL, COMPREHENSIVE  -     CBC W/O DIFF  -     LIPID PANEL    4. Seasonal allergic rhinitis, unspecified trigger- well controlled, continue current treatment     5. Gastroesophageal reflux disease without esophagitis- poorly controlled, likely due to NSAID use, recommended to decrease usage & try Tylenol, will start on meds below and she will update me in 2 wks. -     omeprazole (PRILOSEC) 40 mg capsule; Take 1 Cap by mouth daily. 6. Complex partial seizure with impairment of consciousness ([facility] [address]- stable, continue current treatment per specialist    7. Major depressive disorder with single episode, in full remission ([facility] [address]- well controlled, continue current treatment, weight gain matches up directly to starting SSRI, weight has stabilized, would not recommend any changes. Follow-up and Dispositions    · Return in about 1 year (around 3/2/2021), or if symptoms worsen or fail to improve.         ------------------------------------------------------------------------------------------    Subjective: Annual Wellness Visit- Subsequent Visit    End of Life Planning: This was discussed with her today and she has an advanced directive - a copy has been provided. Reviewed DNR/DNI and patient is interested in remaining a DNR, no changes made. Depression Screen:  3 most recent PHQ Screens 1/6/2020   Little interest or pleasure in doing things Not at all   Feeling down, depressed, irritable, or hopeless Not at all   Total Score PHQ 2 0         Fall Risk:   Fall Risk Assessment, last 12 mths 3/2/2020   Able to walk?  Yes   Fall in past 12 months? No   Fall with injury? -   Number of falls in past 12 months -   Fall Risk Score -       Abuse Screen:  Abuse Screening Questionnaire 3/2/2020   Do you ever feel afraid of your partner? N   Are you in a relationship with someone who physically or mentally threatens you? N   Is it safe for you to go home? Y       Alcohol Risk Factor Screening:  Alcohol Risk Factor Screening:   Do you average 1 drink per night or more than 7 drinks a week:  No    On any one occasion in the past three months have you have had more than 3 drinks containing alcohol:  No    Functional Ability and Level of Safety:   Hearing Screen: Hearing is good. Cognition Screen:  Has your family/caregiver stated any concerns about your memory: no    Ambulation: with no difficulty    Activities of Daily Living:    Exercise: very active  The home contains: handrails and grab bars  Patient does total self care    Adult Nutrition Screen:  No risk factors noted. Health Maintenance:   Daily Aspirin: no  Bone Density: unknown, she reports UTD, have not been able to get imaging from specialist to confirm  Glaucoma Screening: UTD    Immunizations:   Influenza: up to date  Tetanus: up to date  Shingles: up to date  Pneumonia: up to date  Cancer screening:   Cervical: reviewed guidelines, n/a  Breast: reviewed guidelines, UTD, done by OBGYN, records requested  Colon: reviewed guidelines, reviewed she is overdue, reviewed her age, reviewed pros/cons to repeating, will defer       Patient Care Team:  Gilma Olmstead MD as PCP - General (Internal Medicine)  Gilma Olmstead MD as PCP - [facility]  Libia Ellison MD (Gynecology)  Gilberto Vázquez MD (Neurology)  Nicole Hilton MD (Dermatology)  Chen Crum MD (Dermatology)  Jhonathan Tan DO (Ophthalmology)       In addition to the above issues we also reviewed the following acute and/or chronic problems:    Cardiovascular Review  The patient has hyperlipidemia. Since last visit: weight has increased. She reports taking medications as instructed, no medication side effects noted. Diet and Lifestyle: generally follows a low fat low cholesterol diet, generally follows a low sodium diet, exercises regularly. Labs: reviewed and discussed with patient. The following sections were reviewed & updated as appropriate: PMH, PSH, FH, and SH. 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 Authorizing Provider   simvastatin (ZOCOR) 40 mg tablet TAKE 1 TABLET NIGHTLY 2/25/20  Yes Juan Pablo Castrejon MD   lacosamide (VIMPAT) 100 mg tab tablet Take 1 Tab by mouth two (2) times a day. Max Daily Amount: 200 mg. 1/6/20  Yes Nguyen Mc MD   escitalopram oxalate (LEXAPRO) 10 mg tablet Take 1 Tab by mouth daily. 1/6/20  Yes Nguyen Mc MD   montelukast (SINGULAIR) 10 mg tablet TAKE 1 TABLET DAILY 10/9/19  Yes Juan Pablo Castrejon MD   CHOLECALCIFEROL, VITAMIN D3, (VITAMIN D3 PO) Take  by mouth. Yes Provider, Historical   Denosumab (PROLIA) 60 mg/mL injection 60 mg by SubCUTAneous route. Every 6 months   Yes Provider, Historical   calcium-vitamin D (CALCIUM 500+D) 500 mg(1,250mg) -200 unit per tablet Take 1 Tab by mouth two (2) times daily (with meals).    Yes Provider, Historical          Allergies   Allergen Reactions    Fosamax [Alendronate] Other (comments)     GERD    Keppra [Levetiracetam] Other (comments)     \"IT DIDN'T WORK\"    Lyrica [Pregabalin] Unknown (comments)    Pravastatin Other (comments)     Leg pain              Review of Systems   Constitutional: Negative for chills and fever. Respiratory: Negative for cough and shortness of breath. Cardiovascular: Positive for leg swelling (L leg > R leg, has seen ortho, using compression socks w/ good relief). Negative for chest pain and palpitations. Gastrointestinal: Negative for abdominal pain, blood in stool, constipation, diarrhea, heartburn, nausea and vomiting. Musculoskeletal: Positive for joint pain (R hand, on/off, worse at night, using Alevel 2-3 times per week). Negative for myalgias. Neurological: Negative for tingling, focal weakness and headaches. Endo/Heme/Allergies: Does not bruise/bleed easily. Psychiatric/Behavioral: Negative for depression. The patient is not nervous/anxious and does not have insomnia. Objective:   Physical Exam  Constitutional:       General: She is not in acute distress. Appearance: Normal appearance. Eyes:      Conjunctiva/sclera: Conjunctivae normal.   Cardiovascular:      Rate and Rhythm: Regular rhythm. Heart sounds: No murmur. Pulmonary:      Effort: Pulmonary effort is normal.      Breath sounds: Normal breath sounds. No decreased breath sounds or wheezing. Abdominal:      General: Bowel sounds are normal.      Palpations: Abdomen is soft. Tenderness: There is no abdominal tenderness. Musculoskeletal:      Right hand: She exhibits normal range of motion, no tenderness, normal capillary refill and no deformity. Right lower leg: No edema. Left lower leg: Edema (non-pitting) present.    Psychiatric:         Mood and Affect: Mood and affect normal.            Visit Vitals  /70 (BP 1 Location: Right arm)   Pulse 79   Temp 97.8 °F (36.6 °C) (Oral)   Resp 18   Ht 5' 7\" (1.702 m)   Wt 143 lb (64.9 kg)   SpO2 98%   BMI 22.40 kg/m² Advised her to call back or return to office if symptoms worsen/change/persist.  Discussed expected course/resolution/complications of diagnosis in detail with patient. Medication risks/benefits/costs/interactions/alternatives discussed with patient. She was given an after visit summary which includes diagnoses, current medications, & vitals. She expressed understanding with the diagnosis and plan. Aspects of this note may have been generated using voice recognition software. Despite editing, there may be some syntax errors.        Skipper Hammans, MD

## 2020-03-02 NOTE — ACP (ADVANCE CARE PLANNING)
Advance Care Planning (ACP) Provider Note - Comprehensive     Date of ACP Conversation: 03/02/20  Persons included in Conversation:  patient  Length of ACP Conversation in minutes: <16 minutes (Non-Billable)    Authorized Decision Maker (if patient is incapable of making informed decisions):   Healthcare Agent/Medical Power of  under Advance Directive      She has an advanced directive - a copy has been provided & still reflects her wishes. Reviewed DNR/DNI and patient is interested in remaining a DNR, no changes made. General ACP for ALL Patients with Decision Making Capacity:  Importance of advance care planning, including choosing a healthcare agent to communicate patient's healthcare decisions if patient lost the ability to make decisions, such as after a sudden illness or accident  Understanding of the healthcare agent role was assessed and information provided  Opportunity offered to explore how cultural, Oriental orthodox, spiritual, or personal beliefs would affect decisions for future care  Exploration of values, goals, and preferences if recovery is not expected, even with continued medical treatment in the event of: Imminent death or severe, permanent brain injury    For Serious or Chronic Illness:  Understanding of CPR, goals and expected outcomes, benefits and burdens discussed.   Understanding of medical condition  Information on CPR success rates provided (e.g. for CPR in hospital, survival to d/c at two weeks is 22%, for chronically ill or elderly/frail survival is less than 3%)    Interventions Provided:  Recommended communicating the plan and making copies for the healthcare agent, personal physician, and others as appropriate (e.g., health system)  Recommended review of completed ACP document annually or upon change in health status

## 2020-03-03 ENCOUNTER — HOSPITAL ENCOUNTER (OUTPATIENT)
Dept: LAB | Age: 85
Discharge: HOME | End: 2020-03-03
Payer: MEDICARE

## 2020-03-03 PROCEDURE — 80053 COMPREHEN METABOLIC PANEL: CPT

## 2020-03-03 PROCEDURE — 80061 LIPID PANEL: CPT

## 2020-03-03 PROCEDURE — 36415 COLL VENOUS BLD VENIPUNCTURE: CPT

## 2020-03-03 PROCEDURE — 85027 COMPLETE CBC AUTOMATED: CPT

## 2020-03-04 LAB
ALBUMIN SERPL-MCNC: 3.7 G/DL (ref 3.6–4.6)
ALBUMIN/GLOB SERPL: 1.3 {RATIO} (ref 1.2–2.2)
ALP SERPL-CCNC: 96 IU/L (ref 39–117)
ALT SERPL-CCNC: 16 IU/L (ref 0–32)
AST SERPL-CCNC: 20 IU/L (ref 0–40)
BILIRUB SERPL-MCNC: 0.3 MG/DL (ref 0–1.2)
BUN SERPL-MCNC: 14 MG/DL (ref 8–27)
BUN/CREAT SERPL: 23 (ref 12–28)
CALCIUM SERPL-MCNC: 9.1 MG/DL (ref 8.7–10.3)
CHLORIDE SERPL-SCNC: 105 MMOL/L (ref 96–106)
CHOLEST SERPL-MCNC: 140 MG/DL (ref 100–199)
CO2 SERPL-SCNC: 24 MMOL/L (ref 20–29)
CREAT SERPL-MCNC: 0.6 MG/DL (ref 0.57–1)
ERYTHROCYTE [DISTWIDTH] IN BLOOD BY AUTOMATED COUNT: 12.9 % (ref 11.7–15.4)
GLOBULIN SER CALC-MCNC: 2.9 G/DL (ref 1.5–4.5)
GLUCOSE SERPL-MCNC: 83 MG/DL (ref 65–99)
HCT VFR BLD AUTO: 43.7 % (ref 34–46.6)
HDLC SERPL-MCNC: 46 MG/DL
HGB BLD-MCNC: 14.2 G/DL (ref 11.1–15.9)
LDLC SERPL CALC-MCNC: 64 MG/DL (ref 0–99)
MCH RBC QN AUTO: 28 PG (ref 26.6–33)
MCHC RBC AUTO-ENTMCNC: 32.5 G/DL (ref 31.5–35.7)
MCV RBC AUTO: 86 FL (ref 79–97)
PLATELET # BLD AUTO: 263 X10E3/UL (ref 150–450)
POTASSIUM SERPL-SCNC: 4.5 MMOL/L (ref 3.5–5.2)
PROT SERPL-MCNC: 6.6 G/DL (ref 6–8.5)
RBC # BLD AUTO: 5.08 X10E6/UL (ref 3.77–5.28)
SODIUM SERPL-SCNC: 141 MMOL/L (ref 134–144)
TRIGL SERPL-MCNC: 149 MG/DL (ref 0–149)
VLDLC SERPL CALC-MCNC: 30 MG/DL (ref 5–40)
WBC # BLD AUTO: 5.3 X10E3/UL (ref 3.4–10.8)

## 2020-04-11 DIAGNOSIS — J30.2 SEASONAL ALLERGIC RHINITIS, UNSPECIFIED TRIGGER: ICD-10-CM

## 2020-04-12 RX ORDER — MONTELUKAST SODIUM 10 MG/1
TABLET ORAL
Qty: 90 TAB | Refills: 1 | Status: SHIPPED | OUTPATIENT
Start: 2020-04-12 | End: 2020-10-13

## 2020-05-25 DIAGNOSIS — E78.00 PURE HYPERCHOLESTEROLEMIA: ICD-10-CM

## 2020-05-25 RX ORDER — SIMVASTATIN 40 MG/1
TABLET, FILM COATED ORAL
Qty: 90 TAB | Refills: 1 | Status: SHIPPED | OUTPATIENT
Start: 2020-05-25 | End: 2020-11-12 | Stop reason: SDUPTHER

## 2020-08-04 DIAGNOSIS — I65.23 STENOSIS OF BOTH CAROTID ARTERIES WITHOUT INFARCTION: ICD-10-CM

## 2020-08-04 DIAGNOSIS — R42 DIZZINESS AND GIDDINESS: ICD-10-CM

## 2020-08-04 DIAGNOSIS — I67.89 CEREBRAL MICROVASCULAR DISEASE: ICD-10-CM

## 2020-08-04 DIAGNOSIS — G45.1 TRANSIENT ISCHEMIC ATTACK INVOLVING LEFT INTERNAL CAROTID ARTERY: ICD-10-CM

## 2020-08-04 DIAGNOSIS — G40.209 COMPLEX PARTIAL SEIZURE WITH IMPAIRMENT OF CONSCIOUSNESS (HCC): ICD-10-CM

## 2020-08-04 RX ORDER — LACOSAMIDE 100 MG/1
100 TABLET ORAL 2 TIMES DAILY
Qty: 180 TAB | Refills: 4 | Status: SHIPPED | OUTPATIENT
Start: 2020-08-04 | End: 2020-11-09 | Stop reason: SDUPTHER

## 2020-08-06 ENCOUNTER — TELEPHONE (OUTPATIENT)
Dept: NEUROLOGY | Age: 85
End: 2020-08-06

## 2020-08-06 NOTE — TELEPHONE ENCOUNTER
----- Message from Wai Hunt sent at 8/5/2020 12:15 PM EDT -----  Regarding: Dr. Darci Cárdenas  Medication Refill    Caller (if not patient):      Relationship of caller (if not patient):      Best contact number(s):[phone]      Name of medication and dosage if known:\"Vimpat\" 200 mg  2x daily      Is patient out of this medication (yes/no):yes      Pharmacy name:[facility] listed in chart? (yes/no):  Pharmacy phone number:      Details to clarify the request:Pt stated she requested a refill on her Rx last wk, and no response from the doctor. Pt requested a call back to let her know.       Wai Hunt

## 2020-08-06 NOTE — TELEPHONE ENCOUNTER
I called the patient and notified this [facility] and gave the phone number to call to see if she can get it over nighted.  She ran out today

## 2020-09-25 DIAGNOSIS — K21.9 GASTROESOPHAGEAL REFLUX DISEASE WITHOUT ESOPHAGITIS: ICD-10-CM

## 2020-09-25 RX ORDER — OMEPRAZOLE 40 MG/1
CAPSULE, DELAYED RELEASE ORAL
Qty: 30 CAP | Refills: 0 | Status: SHIPPED | OUTPATIENT
Start: 2020-09-25 | End: 2020-11-12 | Stop reason: SDUPTHER

## 2020-10-13 DIAGNOSIS — J30.2 SEASONAL ALLERGIC RHINITIS, UNSPECIFIED TRIGGER: ICD-10-CM

## 2020-10-13 RX ORDER — MONTELUKAST SODIUM 10 MG/1
TABLET ORAL
Qty: 90 TAB | Refills: 1 | Status: SHIPPED | OUTPATIENT
Start: 2020-10-13 | End: 2020-11-23 | Stop reason: SDUPTHER

## 2020-11-09 DIAGNOSIS — I67.89 CEREBRAL MICROVASCULAR DISEASE: ICD-10-CM

## 2020-11-09 DIAGNOSIS — R42 DIZZINESS AND GIDDINESS: ICD-10-CM

## 2020-11-09 DIAGNOSIS — I65.23 STENOSIS OF BOTH CAROTID ARTERIES WITHOUT INFARCTION: ICD-10-CM

## 2020-11-09 DIAGNOSIS — G45.1 TRANSIENT ISCHEMIC ATTACK INVOLVING LEFT INTERNAL CAROTID ARTERY: ICD-10-CM

## 2020-11-09 DIAGNOSIS — G40.209 COMPLEX PARTIAL SEIZURE WITH IMPAIRMENT OF CONSCIOUSNESS (HCC): ICD-10-CM

## 2020-11-09 RX ORDER — LACOSAMIDE 100 MG/1
100 TABLET ORAL 2 TIMES DAILY
Qty: 60 TAB | Refills: 0 | Status: SHIPPED | OUTPATIENT
Start: 2020-11-09 | End: 2020-12-17 | Stop reason: SDUPTHER

## 2020-11-12 DIAGNOSIS — E78.00 PURE HYPERCHOLESTEROLEMIA: ICD-10-CM

## 2020-11-12 DIAGNOSIS — K21.9 GASTROESOPHAGEAL REFLUX DISEASE WITHOUT ESOPHAGITIS: ICD-10-CM

## 2020-11-12 RX ORDER — OMEPRAZOLE 40 MG/1
CAPSULE, DELAYED RELEASE ORAL
Qty: 30 CAP | Refills: 0 | Status: SHIPPED | OUTPATIENT
Start: 2020-11-12 | End: 2020-11-29

## 2020-11-12 RX ORDER — SIMVASTATIN 40 MG/1
TABLET, FILM COATED ORAL
Qty: 90 TAB | Refills: 1 | Status: SHIPPED | OUTPATIENT
Start: 2020-11-12 | End: 2022-03-30 | Stop reason: ALTCHOICE

## 2020-11-12 NOTE — TELEPHONE ENCOUNTER
Requested Prescriptions     Pending Prescriptions Disp Refills    simvastatin (ZOCOR) 40 mg tablet 90 Tab 1     Sig: TAKE 1 TABLET BY MOUTH NIGHTLY    omeprazole (PRILOSEC) 40 mg capsule 30 Cap 0         [facility] - [place], [place] - [facility] Aristides Rubin [facility]  [phone]

## 2020-11-23 DIAGNOSIS — J30.2 SEASONAL ALLERGIC RHINITIS, UNSPECIFIED TRIGGER: ICD-10-CM

## 2020-11-23 NOTE — TELEPHONE ENCOUNTER
Pt changed to mail order    Requested Prescriptions     Pending Prescriptions Disp Refills    montelukast (SINGULAIR) 10 mg tablet 90 Tab 8 e [place], [place] - [address] [facility]

## 2020-11-24 RX ORDER — MONTELUKAST SODIUM 10 MG/1
10 TABLET ORAL DAILY
Qty: 90 TAB | Refills: 1 | Status: SHIPPED | OUTPATIENT
Start: 2020-11-24 | End: 2022-03-29

## 2020-11-27 DIAGNOSIS — K21.9 GASTROESOPHAGEAL REFLUX DISEASE WITHOUT ESOPHAGITIS: ICD-10-CM

## 2020-11-29 RX ORDER — OMEPRAZOLE 40 MG/1
CAPSULE, DELAYED RELEASE ORAL
Qty: 90 CAP | Refills: 1 | Status: SHIPPED | OUTPATIENT
Start: 2020-11-29 | End: 2022-03-29

## 2020-12-14 DIAGNOSIS — G40.209 COMPLEX PARTIAL SEIZURE WITH IMPAIRMENT OF CONSCIOUSNESS (HCC): Primary | ICD-10-CM

## 2020-12-16 LAB — LACOSAMIDE SERPL-MCNC: 10.5 UG/ML (ref 5–10)

## 2020-12-17 ENCOUNTER — OFFICE VISIT (OUTPATIENT)
Dept: NEUROLOGY | Age: 85
End: 2020-12-17
Payer: MEDICARE

## 2020-12-17 VITALS
DIASTOLIC BLOOD PRESSURE: 80 MMHG | RESPIRATION RATE: 12 BRPM | SYSTOLIC BLOOD PRESSURE: 160 MMHG | BODY MASS INDEX: 22.6 KG/M2 | HEART RATE: 69 BPM | OXYGEN SATURATION: 100 % | HEIGHT: 67 IN | WEIGHT: 144 LBS | TEMPERATURE: 97.5 F

## 2020-12-17 DIAGNOSIS — R41.82 ALTERED MENTAL STATUS, UNSPECIFIED ALTERED MENTAL STATUS TYPE: ICD-10-CM

## 2020-12-17 DIAGNOSIS — R42 DIZZINESS AND GIDDINESS: ICD-10-CM

## 2020-12-17 DIAGNOSIS — I65.23 STENOSIS OF BOTH CAROTID ARTERIES WITHOUT INFARCTION: ICD-10-CM

## 2020-12-17 DIAGNOSIS — G40.209 COMPLEX PARTIAL SEIZURE WITH IMPAIRMENT OF CONSCIOUSNESS (HCC): Primary | ICD-10-CM

## 2020-12-17 DIAGNOSIS — I67.89 CEREBRAL MICROVASCULAR DISEASE: ICD-10-CM

## 2020-12-17 DIAGNOSIS — G45.1 TRANSIENT ISCHEMIC ATTACK INVOLVING LEFT INTERNAL CAROTID ARTERY: ICD-10-CM

## 2020-12-17 PROCEDURE — G8420 CALC BMI NORM PARAMETERS: HCPCS | Performed by: PSYCHIATRY & NEUROLOGY

## 2020-12-17 PROCEDURE — G8536 NO DOC ELDER MAL SCRN: HCPCS | Performed by: PSYCHIATRY & NEUROLOGY

## 2020-12-17 PROCEDURE — G9717 DOC PT DX DEP/BP F/U NT REQ: HCPCS | Performed by: PSYCHIATRY & NEUROLOGY

## 2020-12-17 PROCEDURE — 99214 OFFICE O/P EST MOD 30 MIN: CPT | Performed by: PSYCHIATRY & NEUROLOGY

## 2020-12-17 PROCEDURE — 1090F PRES/ABSN URINE INCON ASSESS: CPT | Performed by: PSYCHIATRY & NEUROLOGY

## 2020-12-17 PROCEDURE — G8427 DOCREV CUR MEDS BY ELIG CLIN: HCPCS | Performed by: PSYCHIATRY & NEUROLOGY

## 2020-12-17 PROCEDURE — 1101F PT FALLS ASSESS-DOCD LE1/YR: CPT | Performed by: PSYCHIATRY & NEUROLOGY

## 2020-12-17 RX ORDER — ASPIRIN 81 MG/1
81 TABLET ORAL DAILY
COMMUNITY
End: 2021-12-20 | Stop reason: ALTCHOICE

## 2020-12-17 RX ORDER — LACOSAMIDE 100 MG/1
100 TABLET ORAL 2 TIMES DAILY
Qty: 180 TAB | Refills: 5 | Status: SHIPPED | OUTPATIENT
Start: 2020-12-17 | End: 2021-09-13 | Stop reason: SDUPTHER

## 2020-12-17 NOTE — PROGRESS NOTES
Consult    Subjective:     Zaire Dalal is a 82 y. o.right-handed  female seen today for evaluation at the request of Dr. John Gonzalez of new complaint of some dizziness, and since has been 1 year since her last Doppler, we will check another Doppler today to make sure there is no change. She has not had any recent TIAs or strokelike symptoms. She is not had any side effects on the medication, and no seizures, and is currently on Vimpat 100 mg twice a day and her levels will be rechecked again today, and her medication renewed for her also. She had a seizure over a year ago, but none this year. She does have some irritability and a little hyper but is better on the Lexapro 10 mg a day and she might have to have that increased but we will defer that to Dr. John Gonzalez her PCP. Patient had a normal 24-hour ambulatory EEG 2 years ago. We discussed the pros and cons of coming off the medication the patient decided she wants to keep on the medication she is doing well, no seizures, no side effects, she has gained some weight, but her irritability is markedly improved on the Lexapro she wants to continue that so her Vimpat and her Lexapro are both renewed for the patient today. She has had no new neurologic problems either, no focal weakness, just the weight gain a little bit, and advised her to try to exercise more watch her diet, could possibly be from the Lexapro but usually that is weight neutral, we will see how she does. She is currently on Vimpat 100 mg twice a day tolerating it well. She has had no side effects on the medication, and is tolerating it well otherwise, and still able to get it from her mail order pharmacy at a reasonable price. She's had no falls, no new numbness or weakness in her arms and legs, no headache, no visual problems, no other bulbar symptoms, or neck pain or back pain. She does try to exercise regularly, and does take vitamins. Her X31 and folic acid levels were normal in 2012.  She has no signs of neuropathy. She actually walks fairly well and has only a very mild difficulty with tandem walking, but has a negative Romberg. She had EEGs in the past that showed a left temporal abnormality. She  Is thought to have complex partial seizures. 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 Her Dopplers done 2 year ago have shown mild less than 50% disease bilaterally. She will continue a baby aspirin for her microvascular disease, but she has had no strokelike symptoms    A complete review of systems in symptoms was negative for any other new medical problems, complications or illnesses. She has a past history of goiter, allergies, osteoporosis, SVT, DJD, GERD, reduced hearing, hyperlipidemia, hysterectomy, detached retina repair, colonoscopy, rectocele surgery esophageal dilatation and lip surgery for squamous cell cancer.     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    Right lower lip, s/p moh's surgery (10/30/12)     Seizures ([address]     last seizure 2015    SVT (supraventricular tachycardia) ([address]     as of 10/14/14 pt unaware she has ever had this and does not see a cardiologist    Tinnitus of both ears 3/2/2020      Past Surgical History:   Procedure Laterality Date    HX CATARACT REMOVAL Right 08/31/2016    HX CATARACT REMOVAL Left 09/14/2016    HX COLONOSCOPY  10/7/2010         HX ENDOSCOPY  10/7/2010         HX HYSTERECTOMY  1970's    NIDIA 1974 - ovaries in,vaginal repair    HX MOHS PROCEDURES  2012 7/12- bx of right lower lip, 10/12- s/p mohs for SCC    HX RETINAL DETACHMENT REPAIR Left     detached retina, with laser repair    REPAIR OF RECTOCELE      surgery for rectocele and prolapse  - Dr Smith Kelly     Family History   Problem Relation Age of Onset    Heart Attack Mother 64    Lung Cancer Father     Colon Cancer Sister     No Known Problems Son     No Known Problems Daughter     Anesth Problems Neg Hx       Social History     Tobacco Use    Smoking status: Former Smoker     Packs/day: 0.50     Years: 20.00     Pack years: 10.00     Types: Cigarettes     Quit date: 10/30/1969     Years since quittin.1    Smokeless tobacco: Never Used   Substance Use Topics    Alcohol use: No     Frequency: Never     Binge frequency: Never       Current Outpatient Medications   Medication Sig Dispense Refill    lacosamide (VIMPAT) 100 mg tab tablet Take 1 Tab by mouth two (2) times a day. Max Daily Amount: 200 mg. 180 Tab 5    omeprazole (PRILOSEC) 40 mg capsule TAKE 1 CAPSULE DAILY 90 Cap 1    montelukast (SINGULAIR) 10 mg tablet Take 1 Tab by mouth daily. 90 Tab 1    simvastatin (ZOCOR) 40 mg tablet TAKE 1 TABLET BY MOUTH NIGHTLY 90 Tab 1    escitalopram oxalate (LEXAPRO) 10 mg tablet Take 1 Tab by mouth daily. 90 Tab 5    CHOLECALCIFEROL, VITAMIN D3, (VITAMIN D3 PO) Take  by mouth.  Denosumab (PROLIA) 60 mg/mL injection 60 mg by SubCUTAneous route. Every 6 months      calcium-vitamin D (CALCIUM 500+D) 500 mg(1,250mg) -200 unit per tablet Take 1 Tab by mouth two (2) times daily (with meals).           Allergies   Allergen Reactions    Fosamax [Alendronate] Other (comments)     GERD    Keppra [Levetiracetam] Other (comments)     \"IT DIDN'T WORK\"    Lyrica [Pregabalin] Unknown (comments)    Pravastatin Other (comments)     Leg pain        Review of Systems:  A comprehensive review of systems was negative except for: Ears, nose, mouth, throat, and face: positive for hearing loss and sore mouth  Musculoskeletal: positive for myalgias, arthralgias and stiff joints  Neurological: positive for dizziness, seizures, coordination problems and gait problems     Objective:     I      NEUROLOGICAL EXAM:    Appearance: The patient is well developed, well nourished, provides a coherent history and is in no acute distress. Mental Status: Oriented to time, place and person, and the president, fund of knowledge and cognitive function seems normal, speech is without aphasia or dysarthria. Mood and affect appropriate but slightly anxious and depressed. Cranial Nerves:   Intact visual fields. Fundi are benign, but poorly seen. JUSTIN, EOM's full, no nystagmus, no ptosis. Facial sensation is normal. Corneal reflexes are not tested. Facial movement is symmetric. Hearing is reduced bilaterally. Palate is midline with normal sternocleidomastoid and trapezius muscles are normal. Tongue is midline. Neck without meningismus or bruits  Temporal arteries are not tender or enlarged   Motor:  5/5 strength in upper and lower proximal and distal muscles. Normal bulk and tone. No fasciculations. Rapid alternating movement is symmetric and essentially normal   Reflexes:   Deep tendon reflexes 2+/4 and symmetrical. No Babinski or clonus present   Sensory:   Normal to touch, pinprick and DSS, and vibration mildly decreased in both feet. Gait:  Normal gait. Tremor:   No tremor noted. Cerebellar:  No cerebellar signs present. The patient has slight difficulty doing tandem walking, but has a negative Romberg finger-nose-finger examination shows no tremor   Neurovascular:  Normal heart sounds and regular rhythm, peripheral pulses mildly reduced in both feet, and no carotid bruits. Assessment:     Plan:     Patient will repeat her carotid Doppler studies to make sure there is no progression of disease visit in 1 year, and she still has a slight occasional dizziness. Her Vimpat was renewed because she is tolerating it well has not had any seizures.   She will continue the Lexapro given to her by Dr. Bhavesh Pardo seems to help her mood. She has elevated blood pressure, and we recommended that she see Dr. Bhavesh Pardo for further evaluation, but this might just be whitecoat hypertension. Patient with new problem of wanting to discuss her medications, and after long discussion she decided that she will stay on her medications, her irritability is better on the Lexapro she will continue that, I told her I did not think the medications are causing the weight gain but try to exercise [facility] advised her to stay mentally and physically active and try to exercise 3-3 and half hours a week, and stay mentally active also. Her Vimpat was refilled for the patient today. Her agitation and irritability are better on Lexapro and she will continue that for now. Her medications were just renewed so they were not renewed today. 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 Patient is to continue Vimpat 100 mg b.i.d.    Followup in 12 months time or earlier as needed, and patient to call if any problem, and to get her test results    Signed By: Dennis Moise MD     December 17, 2020

## 2020-12-17 NOTE — LETTER
12/17/2020 Patient: Sylvia Gutierrez YOB: 1935 Date of Visit: 12/17/2020 Luz Elena Barker MD 
[place] [address] [address] [place] [id number] [address] [employer] Dear Luz Elena Barker MD, Thank you for referring Ms. Sylvia Gutierrez to [address] [place] [address] for evaluation. My notes for this consultation are attached. Consult Subjective: Beth Campa is a 82 y. o.right-handed  female seen today for evaluation at the request of Dr. Mirian Sage of new complaint of some dizziness, and since has been 1 year since her last Doppler, we will check another Doppler today to make sure there is no change. She has not had any recent TIAs or strokelike symptoms. She is not had any side effects on the medication, and no seizures, and is currently on Vimpat 100 mg twice a day and her levels will be rechecked again today, and her medication renewed for her also. She had a seizure over a year ago, but none this year. She does have some irritability and a little hyper but is better on the Lexapro 10 mg a day and she might have to have that increased but we will defer that to Dr. Mirian Sage her PCP. Patient had a normal 24-hour ambulatory EEG 2 years ago. We discussed the pros and cons of coming off the medication the patient decided she wants to keep on the medication she is doing well, no seizures, no side effects, she has gained some weight, but her irritability is markedly improved on the Lexapro she wants to continue that so her Vimpat and her Lexapro are both renewed for the patient today. She has had no new neurologic problems either, no focal weakness, just the weight gain a little bit, and advised her to try to exercise more watch her diet, could possibly be from the Lexapro but usually that is weight neutral, we will see how she does. She is currently on Vimpat 100 mg twice a day tolerating it well. She has had no side effects on the medication, and is tolerating it well otherwise, and still able to get it from her mail order pharmacy at a reasonable price. She's had no falls, no new numbness or weakness in her arms and legs, no headache, no visual problems, no other bulbar symptoms, or neck pain or back pain. She does try to exercise regularly, and does take vitamins. Her V63 and folic acid levels were normal in 2012. She has no signs of neuropathy. She actually walks fairly well and has only a very mild difficulty with tandem walking, but has a negative Romberg. She had EEGs in the past that showed a left temporal abnormality. She  Is thought to have complex partial seizures. 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 Her Dopplers done 2 year ago have shown mild less than 50% disease bilaterally. She will continue a baby aspirin for her microvascular disease, but she has had no strokelike symptoms A complete review of systems in symptoms was negative for any other new medical problems, complications or illnesses. She has a past history of goiter, allergies, osteoporosis, SVT, DJD, GERD, reduced hearing, hyperlipidemia, hysterectomy, detached retina repair, colonoscopy, rectocele surgery esophageal dilatation and lip surgery for squamous cell cancer. Past Medical History:  
Diagnosis Date  Allergic rhinitis  Arthritis  Combined forms of age-related cataract of left eye 8/26/2016 KPE IOL OS  
 Combined forms of age-related cataract of right eye 8/26/2016 KPE IOL OD  
 GERD (gastroesophageal reflux disease)  Goiter   
 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   
 last seizure 2015  SVT (supraventricular tachycardia) ([address]   
 as of 10/14/14 pt unaware she has ever had this and does not see a cardiologist  
 Tinnitus of both ears 3/2/2020 Past Surgical History:  
Procedure Laterality Date  HX CATARACT REMOVAL Right 08/31/2016  HX CATARACT REMOVAL Left 09/14/2016  HX COLONOSCOPY  10/7/2010  HX ENDOSCOPY  10/7/2010  HX HYSTERECTOMY  1970's NIDIA 1974 - ovaries in,vaginal repair [id number] PROCEDURES  2012 7/12- bx of right lower lip, 10/12- s/p mohs for SCC  
  HX RETINAL DETACHMENT REPAIR Left   
 detached retina, with laser repair  REPAIR OF RECTOCELE    
 surgery for rectocele and prolapse  - Dr Jersey Leigh Family History Problem Relation Age of Onset  Heart Attack Mother 64  Lung Cancer Father  Colon Cancer Sister  No Known Problems Son  No Known Problems Daughter  Anesth Problems Neg Hx Social History Tobacco Use  Smoking status: Former Smoker Packs/day: 0.50 Years: 20.00 Pack years: 10.00 Types: Cigarettes Quit date: 10/30/1969 Years since quittin.1  Smokeless tobacco: Never Used Substance Use Topics  Alcohol use: No  
  Frequency: Never Binge frequency: Never Current Outpatient Medications Medication Sig Dispense Refill  lacosamide (VIMPAT) 100 mg tab tablet Take 1 Tab by mouth two (2) times a day. Max Daily Amount: 200 mg. 180 Tab 5  
 omeprazole (PRILOSEC) 40 mg capsule TAKE 1 CAPSULE DAILY 90 Cap 1  
 montelukast (SINGULAIR) 10 mg tablet Take 1 Tab by mouth daily. 90 Tab 1  
 simvastatin (ZOCOR) 40 mg tablet TAKE 1 TABLET BY MOUTH NIGHTLY 90 Tab 1  
 escitalopram oxalate (LEXAPRO) 10 mg tablet Take 1 Tab by mouth daily. 90 Tab 5  
 CHOLECALCIFEROL, VITAMIN D3, (VITAMIN D3 PO) Take  by mouth.  Denosumab (PROLIA) 60 mg/mL injection 60 mg by SubCUTAneous route. Every 6 months  calcium-vitamin D (CALCIUM 500+D) 500 mg(1,250mg) -200 unit per tablet Take 1 Tab by mouth two (2) times daily (with meals). Allergies Allergen Reactions  Fosamax [Alendronate] Other (comments) GERD  Keppra [Levetiracetam] Other (comments) \"IT DIDN'T WORK\"  Lyrica [Pregabalin] Unknown (comments)  Pravastatin Other (comments) Leg pain Review of Systems: A comprehensive review of systems was negative except for: Ears, nose, mouth, throat, and face: positive for hearing loss and sore mouth Musculoskeletal: positive for myalgias, arthralgias and stiff joints Neurological: positive for dizziness, seizures, coordination problems and gait problems Objective: I 
 
 
NEUROLOGICAL EXAM: 
 
Appearance: The patient is well developed, well nourished, provides a coherent history and is in no acute distress. Mental Status: Oriented to time, place and person, and the president, fund of knowledge and cognitive function seems normal, speech is without aphasia or dysarthria. Mood and affect appropriate but slightly anxious and depressed. Cranial Nerves:   Intact visual fields. Fundi are benign, but poorly seen. JUSTIN, EOM's full, no nystagmus, no ptosis. Facial sensation is normal. Corneal reflexes are not tested. Facial movement is symmetric. Hearing is reduced bilaterally. Palate is midline with normal sternocleidomastoid and trapezius muscles are normal. Tongue is midline. Neck without meningismus or bruits Temporal arteries are not tender or enlarged Motor:  5/5 strength in upper and lower proximal and distal muscles. Normal bulk and tone. No fasciculations. Rapid alternating movement is symmetric and essentially normal  
Reflexes:   Deep tendon reflexes 2+/4 and symmetrical. No Babinski or clonus present Sensory:   Normal to touch, pinprick and DSS, and vibration mildly decreased in both feet. Gait:  Normal gait. Tremor:   No tremor noted. Cerebellar:  No cerebellar signs present. The patient has slight difficulty doing tandem walking, but has a negative Romberg finger-nose-finger examination shows no tremor Neurovascular:  Normal heart sounds and regular rhythm, peripheral pulses mildly reduced in both feet, and no carotid bruits. Assessment:  
 
Plan: Patient will repeat her carotid Doppler studies to make sure there is no progression of disease visit in 1 year, and she still has a slight occasional dizziness. Her Vimpat was renewed because she is tolerating it well has not had any seizures. She will continue the Lexapro given to her by Dr. Minna Agudelo seems to help her mood. She has elevated blood pressure, and we recommended that she see Dr. Minna Agudelo for further evaluation, but this might just be whitecoat hypertension. Patient with new problem of wanting to discuss her medications, and after long discussion she decided that she will stay on her medications, her irritability is better on the Lexapro she will continue that, I told her I did not think the medications are causing the weight gain but try to exercise she [facility] we advised her to stay mentally and physically active and try to exercise 3-3 and half hours a week, and stay mentally active also. Her Vimpat was refilled for the patient today. Her agitation and irritability are better on Lexapro and she will continue that for now. Her medications were just renewed so they were not renewed today. 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 
Patient has relatively prominent microvascular disease on MRI Patient to continue taking her vitamins and vitamin D every day, start exercising more and see if she improves. Patient is to continue Vimpat 100 mg b.i.d. Followup in 12 months time or earlier as needed, and patient to call if any problem, and to get her test results Signed By: Víctor Guido MD   
 December 17, 2020 If you have questions, please do not hesitate to call me. I look forward to following your patient along with you. Sincerely, Víctor Guido MD

## 2021-02-08 ENCOUNTER — TELEPHONE (OUTPATIENT)
Dept: INTERNAL MEDICINE CLINIC | Age: 86
End: 2021-02-08

## 2021-02-08 NOTE — TELEPHONE ENCOUNTER
RC to son, on previous HIPAA. He is caring for both parents with Covid, father in the hospital.  Says mom always has cold like symptoms. Had first covid vaccine on 01/29/21. Started feeling like flu like symptoms on Tuesday, 02/02/21. Tested positive for covid on 02/05/21. She is feeling better today. He came to help care for them. He has been trying to mask and follow precautions but has been around both parents for 5 days. He is asking, since he thinks they were positive long before tested, can they have another covid test on Wednesday (would be five days from + test) or wait full 10 days to come out of quarantine. His sister wants to come relieve him on Wednesday, does he need to get tested before going home and back to work. He didn't like my answers so I told him I would check with Dr. Maricel Mendieta and call back. Told him mom should quarantine 10 days from + test and he should be tested before returning home.

## 2021-02-08 NOTE — TELEPHONE ENCOUNTER
Son Taniya Chanel is calling to advise his mother tested positive for Covid on Friday and now he is there taking care of her. He's asking for a call from Nurse or doctor as soon as possible to help him answer a few questions on how they should proceed. He said she was also complaining of cold/flu like symptoms 10 days before she even tested positive.  His questions involve how long she'll need to quarantine, does she get tested after she quarantines, etc.         Florin Jorgensen  [phone]

## 2021-02-22 ENCOUNTER — TELEPHONE (OUTPATIENT)
Dept: INTERNAL MEDICINE CLINIC | Age: 86
End: 2021-02-22

## 2021-02-22 NOTE — TELEPHONE ENCOUNTER
pb Donald (son) [phone]     Requesting a call back regarding his mother. She was confirmed earlier this month with covid and his father is in the hospital with covid. His mother fell and went to er and was told to make appt with pcp to have stitches removed later this week. He would like to talk to dr Marcelle Ahumada about what is going on.

## 2021-02-22 NOTE — TELEPHONE ENCOUNTER
RC to son, Serafin Brown, not on HIPAA but patient gave verbal okay to talk to him. Advised should add him to her HIPAA form. Patient has appointment for second covid shot at noon on Friday. Asking about scheduling appointment for mom for suture removal on Friday (5-7 days out following a fall) or Monday when sister will be here. Was seen at [facility], following a fall, records printed and on Dr. Payam Yuan' desk. Told him  Dr. Payam Yuan had no availability but other providers do. He said he was hoping to discuss more frequent falls and neurology and dementia. Told him I could look more closely at the schedule tomorrow and would call back with plan.

## 2021-02-23 NOTE — TELEPHONE ENCOUNTER
CT patient and son. Discussed with Dr. Cate Velez. Patient has appointment for Covid vaccine on Friday @12, okay to come afterwards.

## 2021-02-26 ENCOUNTER — OFFICE VISIT (OUTPATIENT)
Dept: INTERNAL MEDICINE CLINIC | Age: 86
End: 2021-02-26
Payer: MEDICARE

## 2021-02-26 VITALS
WEIGHT: 138.6 LBS | OXYGEN SATURATION: 98 % | HEIGHT: 68 IN | RESPIRATION RATE: 14 BRPM | DIASTOLIC BLOOD PRESSURE: 69 MMHG | SYSTOLIC BLOOD PRESSURE: 136 MMHG | HEART RATE: 75 BPM | BODY MASS INDEX: 21.01 KG/M2
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
## 2021-02-26 DIAGNOSIS — U07.1 COVID-19 VIRUS INFECTION: ICD-10-CM

## 2021-02-26 DIAGNOSIS — Z48.02 VISIT FOR SUTURE REMOVAL: ICD-10-CM

## 2021-02-26 PROCEDURE — 1090F PRES/ABSN URINE INCON ASSESS: CPT | Performed by: INTERNAL MEDICINE

## 2021-02-26 PROCEDURE — G8536 NO DOC ELDER MAL SCRN: HCPCS | Performed by: INTERNAL MEDICINE

## 2021-02-26 PROCEDURE — G8420 CALC BMI NORM PARAMETERS: HCPCS | Performed by: INTERNAL MEDICINE

## 2021-02-26 PROCEDURE — 1101F PT FALLS ASSESS-DOCD LE1/YR: CPT | Performed by: INTERNAL MEDICINE

## 2021-02-26 PROCEDURE — 99213 OFFICE O/P EST LOW 20 MIN: CPT | Performed by: INTERNAL MEDICINE

## 2021-02-26 PROCEDURE — G0463 HOSPITAL OUTPT CLINIC VISIT: HCPCS | Performed by: INTERNAL MEDICINE

## 2021-02-26 PROCEDURE — G8427 DOCREV CUR MEDS BY ELIG CLIN: HCPCS | Performed by: INTERNAL MEDICINE

## 2021-02-26 PROCEDURE — G9717 DOC PT DX DEP/BP F/U NT REQ: HCPCS | Performed by: INTERNAL MEDICINE

## 2021-02-26 PROCEDURE — 99999 REMOVAL OF SUTURES: CPT | Performed by: INTERNAL MEDICINE

## 2021-02-26 NOTE — PATIENT INSTRUCTIONS
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
## 2021-03-10 ENCOUNTER — TELEPHONE (OUTPATIENT)
Dept: INTERNAL MEDICINE CLINIC | Age: 86
End: 2021-03-10
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
## 2021-03-11 NOTE — TELEPHONE ENCOUNTER
Pt's son brought her to her recent visit. Did not mention any of this. Needs to RTC, with her daughter. Since daughter is out of state she can Skype/FaceTime into the appointment. She needs to call APS. I would also make arrangements for her father to live w/ her or her brother once discharged. If her concerns are accurate then her father should not go home.

## 2021-03-12 NOTE — TELEPHONE ENCOUNTER
RC to daughter, Luisa Bowen. Advised she should go ahead and call APS. Scheduled appointment with Dr. Edgard Davis on 03/16/21 for 30 minutes. Nia Silverman, son, to bring her and Luisa Bowen will be available to [facility] during visit.
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
## 2021-03-16 ENCOUNTER — TELEPHONE (OUTPATIENT)
Dept: INTERNAL MEDICINE CLINIC | Age: 86
End: 2021-03-16

## 2021-03-16 NOTE — TELEPHONE ENCOUNTER
Bautista Goldmant [phone]     Daughter Duane Easley [phone] calling regarding her mother's appt today, she refuses to come in. She is requesting a call back, but has a dr's appt today and if she cannot be reached to call her brother at number above.
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
## 2021-03-17 ENCOUNTER — TELEPHONE (OUTPATIENT)
Dept: INTERNAL MEDICINE CLINIC | Age: 86
End: 2021-03-17

## 2021-03-17 NOTE — TELEPHONE ENCOUNTER
Call to daughter, Artur Martin, left message regarding paperwork received; Is Saúl Marshall aware of it? Requested she call and let me know.

## 2021-03-17 NOTE — TELEPHONE ENCOUNTER
Received information from [facility]. Called to question this and told by daughter to ignore.

## 2021-03-19 ENCOUNTER — TELEPHONE (OUTPATIENT)
Dept: INTERNAL MEDICINE CLINIC | Age: 86
End: 2021-03-19

## 2021-03-19 NOTE — TELEPHONE ENCOUNTER
Attempted to reach patient via phone, unsucessful. Left message to return call. Will let Dr. Yehuda Mccain know she is calling.

## 2021-03-19 NOTE — TELEPHONE ENCOUNTER
Meliza Marroquin (Self) [phone] (H)     Pt is requesting a call back from dr Moncho Burleson with a problem she is having?

## 2021-03-30 ENCOUNTER — TELEPHONE (OUTPATIENT)
Dept: NEUROLOGY | Age: 86
End: 2021-03-30
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
## 2021-03-30 NOTE — TELEPHONE ENCOUNTER
Tried to call the patient to get in for an appointment, but no answer.   Patient's son needs to be at an appointment with her

## 2021-09-13 ENCOUNTER — TELEPHONE (OUTPATIENT)
Dept: NEUROLOGY | Age: 86
End: 2021-09-13

## 2021-09-13 DIAGNOSIS — R41.82 ALTERED MENTAL STATUS, UNSPECIFIED ALTERED MENTAL STATUS TYPE: ICD-10-CM

## 2021-09-13 DIAGNOSIS — G45.1 TRANSIENT ISCHEMIC ATTACK INVOLVING LEFT INTERNAL CAROTID ARTERY: ICD-10-CM

## 2021-09-13 DIAGNOSIS — R42 DIZZINESS AND GIDDINESS: ICD-10-CM

## 2021-09-13 DIAGNOSIS — I67.89 CEREBRAL MICROVASCULAR DISEASE: ICD-10-CM

## 2021-09-13 DIAGNOSIS — I65.23 STENOSIS OF BOTH CAROTID ARTERIES WITHOUT INFARCTION: ICD-10-CM

## 2021-09-13 DIAGNOSIS — G40.209 COMPLEX PARTIAL SEIZURE WITH IMPAIRMENT OF CONSCIOUSNESS (HCC): ICD-10-CM

## 2021-09-13 NOTE — TELEPHONE ENCOUNTER
----- Message from Carmen De Paz sent at 9/13/2021  3:10 PM EDT -----  Regarding: Dr. Nava First (if not patient): Self    Relationship of caller (if not patient): N/A    Best contact number(s): [phone]    Name of medication and dosage if known: \"Vimpat\" 100mgs    Is patient out of this medication (yes/no):  No     Pharmacy name: [address] [place] [address] Scottie listed in chart? (yes/no): Yes    Pharmacy phone number: [phone]      Date of last visit: 12/17/2020    Details to clarify the request: N/A

## 2021-09-13 NOTE — TELEPHONE ENCOUNTER
Patient requesting a refill of Vimpat. Last office visit: 12/17/2020    Last refill: 12/17/2020    Please review and fill if warranted.

## 2021-09-16 RX ORDER — LACOSAMIDE 100 MG/1
100 TABLET ORAL 2 TIMES DAILY
Qty: 180 TABLET | Refills: 5 | Status: SHIPPED | OUTPATIENT
Start: 2021-09-16 | End: 2021-09-21 | Stop reason: SDUPTHER

## 2021-09-21 ENCOUNTER — TELEPHONE (OUTPATIENT)
Dept: NEUROLOGY | Age: 86
End: 2021-09-21

## 2021-09-21 DIAGNOSIS — R41.82 ALTERED MENTAL STATUS, UNSPECIFIED ALTERED MENTAL STATUS TYPE: ICD-10-CM

## 2021-09-21 DIAGNOSIS — G45.1 TRANSIENT ISCHEMIC ATTACK INVOLVING LEFT INTERNAL CAROTID ARTERY: ICD-10-CM

## 2021-09-21 DIAGNOSIS — I67.89 CEREBRAL MICROVASCULAR DISEASE: ICD-10-CM

## 2021-09-21 DIAGNOSIS — R42 DIZZINESS AND GIDDINESS: ICD-10-CM

## 2021-09-21 DIAGNOSIS — I65.23 STENOSIS OF BOTH CAROTID ARTERIES WITHOUT INFARCTION: ICD-10-CM

## 2021-09-21 DIAGNOSIS — G40.209 COMPLEX PARTIAL SEIZURE WITH IMPAIRMENT OF CONSCIOUSNESS (HCC): ICD-10-CM

## 2021-09-21 RX ORDER — LACOSAMIDE 100 MG/1
100 TABLET ORAL 2 TIMES DAILY
Qty: 180 TABLET | Refills: 5 | Status: SHIPPED | OUTPATIENT
Start: 2021-09-21 | End: 2022-04-26 | Stop reason: SDUPTHER

## 2021-09-21 RX ORDER — LACOSAMIDE 100 MG/1
100 TABLET ORAL 2 TIMES DAILY
Qty: 180 TABLET | Refills: 5 | Status: SHIPPED | OUTPATIENT
Start: 2021-09-21 | End: 2021-09-21 | Stop reason: SDUPTHER

## 2021-09-21 NOTE — TELEPHONE ENCOUNTER
[facility] call [facility] [place] for my epilepsy medication 100mg vimpat, She states that it was supposed to be done yesterday. She has run out.

## 2021-09-25 DIAGNOSIS — R41.82 ALTERED MENTAL STATUS, UNSPECIFIED ALTERED MENTAL STATUS TYPE: ICD-10-CM

## 2021-09-26 RX ORDER — ESCITALOPRAM OXALATE 10 MG/1
TABLET ORAL
Qty: 90 TABLET | Refills: 0 | Status: SHIPPED | OUTPATIENT
Start: 2021-09-26 | End: 2022-03-29 | Stop reason: SDUPTHER

## 2021-12-17 ENCOUNTER — TELEPHONE (OUTPATIENT)
Dept: NEUROLOGY | Age: 86
End: 2021-12-17
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
## 2021-12-17 NOTE — TELEPHONE ENCOUNTER
This person is not on the permission to release information. I called and left a message that she isn't on the release so I was not able to talk to her about the patient.   I did not leave any patient information on the voicemail

## 2021-12-20 ENCOUNTER — OFFICE VISIT (OUTPATIENT)
Dept: NEUROLOGY | Age: 86
End: 2021-12-20
Payer: MEDICARE

## 2021-12-20 ENCOUNTER — TELEPHONE (OUTPATIENT)
Dept: NEUROLOGY | Age: 86
End: 2021-12-20

## 2021-12-20 VITALS
TEMPERATURE: 97.9 F | HEIGHT: 68 IN | HEART RATE: 72 BPM | SYSTOLIC BLOOD PRESSURE: 128 MMHG | BODY MASS INDEX: 20.19 KG/M2 | WEIGHT: 133.2 LBS | DIASTOLIC BLOOD PRESSURE: 66 MMHG | OXYGEN SATURATION: 98 %

## 2021-12-20 DIAGNOSIS — G40.209 COMPLEX PARTIAL SEIZURE WITH IMPAIRMENT OF CONSCIOUSNESS (HCC): ICD-10-CM

## 2021-12-20 DIAGNOSIS — G45.1 TRANSIENT ISCHEMIC ATTACK INVOLVING LEFT INTERNAL CAROTID ARTERY: Primary | ICD-10-CM

## 2021-12-20 DIAGNOSIS — I65.23 STENOSIS OF BOTH CAROTID ARTERIES WITHOUT INFARCTION: ICD-10-CM

## 2021-12-20 DIAGNOSIS — R48.2 GAIT APRAXIA OF ELDERLY: ICD-10-CM

## 2021-12-20 PROCEDURE — G8420 CALC BMI NORM PARAMETERS: HCPCS | Performed by: PSYCHIATRY & NEUROLOGY

## 2021-12-20 PROCEDURE — 99214 OFFICE O/P EST MOD 30 MIN: CPT | Performed by: PSYCHIATRY & NEUROLOGY

## 2021-12-20 PROCEDURE — G8536 NO DOC ELDER MAL SCRN: HCPCS | Performed by: PSYCHIATRY & NEUROLOGY

## 2021-12-20 PROCEDURE — G8427 DOCREV CUR MEDS BY ELIG CLIN: HCPCS | Performed by: PSYCHIATRY & NEUROLOGY

## 2021-12-20 PROCEDURE — 1101F PT FALLS ASSESS-DOCD LE1/YR: CPT | Performed by: PSYCHIATRY & NEUROLOGY

## 2021-12-20 PROCEDURE — G9717 DOC PT DX DEP/BP F/U NT REQ: HCPCS | Performed by: PSYCHIATRY & NEUROLOGY

## 2021-12-20 PROCEDURE — 1090F PRES/ABSN URINE INCON ASSESS: CPT | Performed by: PSYCHIATRY & NEUROLOGY

## 2021-12-20 RX ORDER — CETIRIZINE HCL 10 MG
TABLET ORAL
COMMUNITY
End: 2021-12-20 | Stop reason: ALTCHOICE

## 2021-12-20 NOTE — LETTER
12/20/2021    Patient: Ovidio Adams   YOB: 1935   Date of Visit: 12/20/2021     [facility], [address] [facility]  [address] [place] [phone]  [place] [place]    Dear Connie Harris MD,      Thank you for referring Ms. Lyudmila Duran to [address] [place] [address] for evaluation. My notes for this consultation are attached. Consult    Subjective:     Ovidio Adams is a 82 y. o.right-handed  female seen today for evaluation at the request of Dr. Jane Rhodes of new complaint of some unsteadiness and balance difficulty, that caused her to have a fall recently, but she did not seem to injure herself other than just bruised her hip, but her friends note that she seems unsteady walking, but she denies any weakness or numbness in her feet, just occasionally tends to lose her balance. Her carotid Doppler studies done 1 year ago showed mild disease only and we will repeat those again just to make sure in about 2 weeks, and her MRI scan done in 2009 just showed some mild microvascular disease and age-related changes, and we may need to repeat that again if she does not get better with physical therapy. She has not had any seizures either, and is currently on Vimpat 100 mg twice a day and tolerating it well. She thinks it may be partly the medication that makes her unsteady. She does have some irritability and a little hyper but is better on the Lexapro 10 mg a day and she might have to have that increased but we will defer that to Dr. Jane Rhodes her PCP. Patient had a normal 24-hour ambulatory EEG 3 years ago. We discussed the pros and cons of coming off the medication the patient decided she wants to keep on the medication she is doing well, no seizures, no side effects, she has gained some weight, but her irritability is markedly improved on the Lexapro she wants to continue that so her Vimpat and her Lexapro are both renewed for the patient today.   She has had no new neurologic problems either, no focal weakness, just the weight gain a little bit, and advised her to try to exercise more watch her diet, could possibly be from the Lexapro but usually that is weight neutral, we will see how she does. She is currently on Vimpat 100 mg twice a day tolerating it well. She has had no side effects on the medication, and is tolerating it well otherwise, and still able to get it from her mail order pharmacy at a reasonable price. She's had no new numbness or weakness in her arms and legs, no headache, no visual problems, no other bulbar symptoms, or neck pain or back pain. She does try to exercise regularly, and does take vitamins. Her Y74 and folic acid levels were normal in 2012. She has no signs of neuropathy. She actually walks fairly well and has only a very mild difficulty with tandem walking, but has a negative Romberg. She had EEGs in the past that showed a left temporal abnormality. She  Is thought to have complex partial seizures. 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 Her Dopplers done 2 year ago have shown mild less than 50% disease bilaterally. She will continue a baby aspirin for her microvascular disease, but she has had no strokelike symptoms  Patient's  unfortunately passed away recently from pneumonia. A complete review of systems in symptoms was negative for any other new medical problems, complications or illnesses. She has a past history of goiter, allergies, osteoporosis, SVT, DJD, GERD, reduced hearing, hyperlipidemia, hysterectomy, detached retina repair, colonoscopy, rectocele surgery esophageal dilatation and lip surgery for squamous cell cancer.     Past Medical History:   Diagnosis Date    Allergic rhinitis     Arthritis     Combined forms of age-related cataract of left eye 8/26/2016    KPE IOL OS    Combined forms of age-related cataract of right eye 8/26/2016    KPE IOL OD    GERD (gastroesophageal reflux disease)     Goiter     biopsy neg Dr Jazmin Auguste - managed here    Hearing reduced     Wilhemena Loffler     Dr Epi Barajas SCC (squamous cell carcinoma), lip 2012    Right lower lip, s/p moh's surgery (10/30/12)     Seizures ([address]     last seizure     SVT (supraventricular tachycardia) ([address]     as of 10/14/14 pt unaware she has ever had this and does not see a cardiologist    Tinnitus of both ears 3/2/2020      Past Surgical History:   Procedure Laterality Date    HX CATARACT REMOVAL Right 2016    HX CATARACT REMOVAL Left 2016    HX COLONOSCOPY  10/7/2010         HX ENDOSCOPY  10/7/2010         HX HYSTERECTOMY      NIDIA  - ovaries in,vaginal repair    HX MOHS PROCEDURES  - bx of right lower lip, 10/12- s/p mohs for SCC    HX RETINAL DETACHMENT REPAIR Left     detached retina, with laser repair    GA REPAIR OF RECTOCELE      surgery for rectocele and prolapse  - Dr Gilma Lopez     Family History   Problem Relation Age of Onset    Heart Attack Mother 64    Lung Cancer Father     Colon Cancer Sister     No Known Problems Son     No Known Problems Daughter     Anesth Problems Neg Hx       Social History     Tobacco Use    Smoking status: Former Smoker     Packs/day: 0.50     Years: 20.00     Pack years: 10.00     Types: Cigarettes     Quit date: 10/30/1969     Years since quittin.1    Smokeless tobacco: Never Used   Substance Use Topics    Alcohol use: No       Current Outpatient Medications   Medication Sig Dispense Refill    escitalopram oxalate (LEXAPRO) 10 mg tablet TAKE ONE TABLET BY MOUTH ONE TIME DAILY  90 Tablet 0    lacosamide (VIMPAT) 100 mg tab tablet Take 1 Tablet by mouth two (2) times a day. Max Daily Amount: 200 mg. 180 Tablet 5    omeprazole (PRILOSEC) 40 mg capsule TAKE 1 CAPSULE DAILY 90 Cap 1    montelukast (SINGULAIR) 10 mg tablet Take 1 Tab by mouth daily.  90 Tab 1    simvastatin (ZOCOR) 40 mg tablet TAKE 1 TABLET BY MOUTH NIGHTLY 90 Tab 1    CHOLECALCIFEROL, VITAMIN D3, (VITAMIN D3 PO) Take  by mouth.  Denosumab (PROLIA) 60 mg/mL injection 60 mg by SubCUTAneous route. Every 6 months      calcium-vitamin D (CALCIUM 500+D) 500 mg(1,250mg) -200 unit per tablet Take 1 Tablet by mouth two (2) times daily (with meals). Allergies   Allergen Reactions    Fosamax [Alendronate] Other (comments)     GERD    Keppra [Levetiracetam] Other (comments)     \"IT DIDN'T WORK\"    Lyrica [Pregabalin] Unknown (comments)    Pravastatin Other (comments)     Leg pain        Review of Systems:  A comprehensive review of systems was negative except for: Ears, nose, mouth, throat, and face: positive for hearing loss and sore mouth  Musculoskeletal: positive for myalgias, arthralgias and stiff joints  Neurological: positive for dizziness, seizures, coordination problems and gait problems     Objective:     I      NEUROLOGICAL EXAM:    Appearance: The patient is well developed, well nourished, provides a coherent history and is in no acute distress. Mental Status: Oriented to time, place and person, and the president, fund of knowledge and cognitive function seems normal, speech is without aphasia or dysarthria. Mood and affect appropriate but slightly anxious and depressed. Cranial Nerves:   Intact visual fields. Fundi are benign, but poorly seen. JUSTIN, EOM's full, no nystagmus, no ptosis. Facial sensation is normal. Corneal reflexes are not tested. Facial movement is symmetric. Hearing is reduced bilaterally. Palate is midline with normal sternocleidomastoid and trapezius muscles are normal. Tongue is midline. Neck without meningismus or bruits  Temporal arteries are not tender or enlarged   Motor:  5/5 strength in upper and lower proximal and distal muscles. Normal bulk and tone. No fasciculations.   Rapid alternating movement is symmetric and essentially normal   Reflexes:   Deep tendon reflexes 1+/4 and symmetrical. No Babinski or clonus present   Sensory:   Normal to touch, pinprick and DSS, and vibration mildly decreased in both feet. Gait:  Abnormal gait as patient is a little unsteady on Romberg and tandem. Tremor:   No tremor noted. Cerebellar:   Mildly abnormal cerebellar signs present on tandem walking, and Romberg testing, finger-nose-finger examination shows no tremor   Neurovascular:  Normal heart sounds and regular rhythm, peripheral pulses mildly reduced in both feet, and no carotid bruits. 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 Because of her seizures in the past she will continue her Vimpat 100 mg twice a day, and that was just renewed for her, and her last levels were therapeutic so we will just continue that dose. Patient with mild unsteadiness walking, will try physical therapy, and if she does not get better we may need to check a B12 level and repeat her MRI of the brain again to make sure there is no other structural cause. For her microvascular disease we will repeat a carotid Doppler study since this 1 year since her last study should do that in 2 weeks. We encouraged her to stay active and exercise regularly and call us if there is any change in her condition. [facility] we advised her to stay mentally and physically active and try to exercise 3-3 and half hours a week, and stay mentally active also. Gait disorder most likely secondary to mild senile gait apraxia  Patient does not want to increase her medications because she had side effects at higher doses in the past  Patient has relatively prominent microvascular disease on MRI   Patient to continue taking her vitamins and vitamin D every day, start exercising more and see if she improves.   Patient is to continue Vimpat 100 mg b.i.d. Followup in 12 months time or earlier as needed, and patient to call if any problem, and to get her test results    Signed By: Tawanda Stratton MD     December 20, 2021             If you have questions, please do not hesitate to call me. I look forward to following your patient along with you.       Sincerely,    Tawanda Stratton MD

## 2021-12-21 NOTE — TELEPHONE ENCOUNTER
I called the patient to notify her that the  Vimpat was sent to [facility] on 9/21/2021 with a refill  And the   Escitalopram was sent to [facility] on  9/26/2021 with several refills    Refills should not be be due yet.     I left this generically on message and asked for a call back if anything further is required

## 2022-03-29 ENCOUNTER — OFFICE VISIT (OUTPATIENT)
Dept: INTERNAL MEDICINE CLINIC | Age: 87
End: 2022-03-29
Payer: MEDICARE

## 2022-03-29 VITALS
HEART RATE: 82 BPM | SYSTOLIC BLOOD PRESSURE: 135 MMHG | DIASTOLIC BLOOD PRESSURE: 70 MMHG | RESPIRATION RATE: 16 BRPM | TEMPERATURE: 98.1 F | OXYGEN SATURATION: 97 % | HEIGHT: 68 IN | BODY MASS INDEX: 20.67 KG/M2 | WEIGHT: 136.4 LBS

## 2022-03-29 DIAGNOSIS — M81.0 AGE-RELATED OSTEOPOROSIS WITHOUT CURRENT PATHOLOGICAL FRACTURE: ICD-10-CM

## 2022-03-29 DIAGNOSIS — F33.41 RECURRENT MAJOR DEPRESSIVE DISORDER, IN PARTIAL REMISSION (HCC): ICD-10-CM

## 2022-03-29 DIAGNOSIS — E78.00 PURE HYPERCHOLESTEROLEMIA: ICD-10-CM

## 2022-03-29 DIAGNOSIS — Z00.00 MEDICARE ANNUAL WELLNESS VISIT, SUBSEQUENT: Primary | ICD-10-CM

## 2022-03-29 DIAGNOSIS — Z71.89 ADVANCED CARE PLANNING/COUNSELING DISCUSSION: ICD-10-CM

## 2022-03-29 DIAGNOSIS — F02.80 LATE ONSET ALZHEIMER'S DEMENTIA WITHOUT BEHAVIORAL DISTURBANCE (HCC): ICD-10-CM

## 2022-03-29 DIAGNOSIS — Z01.84 ENCOUNTER FOR IMMUNOLOGICAL TEST: ICD-10-CM

## 2022-03-29 DIAGNOSIS — H61.23 EXCESSIVE CERUMEN IN BOTH EAR CANALS: ICD-10-CM

## 2022-03-29 DIAGNOSIS — K21.9 GASTROESOPHAGEAL REFLUX DISEASE WITHOUT ESOPHAGITIS: ICD-10-CM

## 2022-03-29 DIAGNOSIS — J30.2 SEASONAL ALLERGIC RHINITIS, UNSPECIFIED TRIGGER: ICD-10-CM

## 2022-03-29 DIAGNOSIS — G30.1 LATE ONSET ALZHEIMER'S DEMENTIA WITHOUT BEHAVIORAL DISTURBANCE (HCC): ICD-10-CM

## 2022-03-29 PROBLEM — S09.90XA CLOSED HEAD INJURY: Status: RESOLVED | Noted: 2022-03-29 | Resolved: 2022-03-29

## 2022-03-29 PROBLEM — S01.01XA LACERATION OF SCALP: Status: ACTIVE | Noted: 2022-03-29

## 2022-03-29 PROBLEM — F33.9 RECURRENT MAJOR DEPRESSIVE DISORDER (HCC): Status: ACTIVE | Noted: 2020-03-02
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
## 2022-03-29 PROBLEM — H90.3 SENSORINEURAL HEARING LOSS (SNHL) OF BOTH EARS: Status: ACTIVE | Noted: 2022-03-29
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
## 2022-03-29 PROBLEM — S09.90XA CLOSED HEAD INJURY: Status: ACTIVE | Noted: 2022-03-29

## 2022-03-29 PROBLEM — S01.01XA LACERATION OF SCALP: Status: RESOLVED | Noted: 2022-03-29 | Resolved: 2022-03-29

## 2022-03-29 PROCEDURE — 1101F PT FALLS ASSESS-DOCD LE1/YR: CPT | Performed by: INTERNAL MEDICINE

## 2022-03-29 PROCEDURE — 69209 REMOVE IMPACTED EAR WAX UNI: CPT | Performed by: INTERNAL MEDICINE

## 2022-03-29 PROCEDURE — 99214 OFFICE O/P EST MOD 30 MIN: CPT | Performed by: INTERNAL MEDICINE

## 2022-03-29 PROCEDURE — G8536 NO DOC ELDER MAL SCRN: HCPCS | Performed by: INTERNAL MEDICINE

## 2022-03-29 PROCEDURE — G0439 PPPS, SUBSEQ VISIT: HCPCS | Performed by: INTERNAL MEDICINE

## 2022-03-29 PROCEDURE — 1090F PRES/ABSN URINE INCON ASSESS: CPT | Performed by: INTERNAL MEDICINE

## 2022-03-29 PROCEDURE — G8420 CALC BMI NORM PARAMETERS: HCPCS | Performed by: INTERNAL MEDICINE

## 2022-03-29 PROCEDURE — G8427 DOCREV CUR MEDS BY ELIG CLIN: HCPCS | Performed by: INTERNAL MEDICINE

## 2022-03-29 PROCEDURE — G9717 DOC PT DX DEP/BP F/U NT REQ: HCPCS | Performed by: INTERNAL MEDICINE

## 2022-03-29 PROCEDURE — G0463 HOSPITAL OUTPT CLINIC VISIT: HCPCS | Performed by: INTERNAL MEDICINE

## 2022-03-29 RX ORDER — ESCITALOPRAM OXALATE 10 MG/1
10 TABLET ORAL DAILY
Qty: 90 TABLET | Refills: 0 | Status: SHIPPED | OUTPATIENT
Start: 2022-03-29 | End: 2022-08-23

## 2022-03-29 NOTE — ASSESSMENT & PLAN NOTE
Unknown control, followed by specialist, she reports being do for injection. Will call over to specialist (Dr Andra Santos old office) to get dates of last shot.   Will either get her set up her prior to moving or okay to hold off until she does move

## 2022-03-29 NOTE — ASSESSMENT & PLAN NOTE
She reports stable, she is grieving death of her , reviewed increasing dose with her family but I'm not sure she is even taking. Family will check pill bottles and call back and let me know.   Assuming she is not taking medications she will restart medications

## 2022-03-29 NOTE — ACP (ADVANCE CARE PLANNING)
Advance Care Planning   Advance Care Planning (ACP) Physician/NP/PA (Provider) Conversation    Date of ACP Conversation: 03/29/22  Persons included in Conversation:  patient  Length of ACP Conversation in minutes: <16 minutes (Non-Billable)    Authorized Decision Maker (if patient is incapable of making informed decisions):   Named in Advance Directive or Healthcare Power of       Primary Decision Maker: Sirisha Galvin - [phone]    Secondary Decision Maker: Kyung Quinonez - [facility] - [phone]    She has an advanced directive - a copy has been provided & still reflects her wishes. Reviewed DNR/DNI and patient is interested in remaining a DNR, no changes made.        Gaylan Spatz, MD

## 2022-03-29 NOTE — Clinical Note
halfway is refaxing form, please look out for it.   Need to get from [facility] (Dr Randa Law - retired), her last note & date of prolia injection

## 2022-03-29 NOTE — PATIENT INSTRUCTIONS

## 2022-03-29 NOTE — PROGRESS NOTES
Gely Stone is a 82 y.o. female who was seen in clinic today (3/29/2022) for a full physical.             Assessment & Plan:   Below is the assessment and plan developed based on review of pertinent history, physical exam, labs, studies, and medications. 1. Medicare annual wellness visit, subsequent  2. Advanced care planning/counseling discussion  3. Encounter for immunological test  -     QUANTIFERON-TB GOLD PLUS  4. Late onset Alzheimer's dementia without behavioral disturbance [facility])  Assessment & Plan:  New dx, MMSE 23/30, based on family reports this is not new and has been going on for years. Agree w/ moving to [facility] and being near family. Reviewed expectations w/ change. Will defer starting medications to her new PCP and family is okay with this plan   5. Recurrent major depressive disorder, in partial remission [facility])  Assessment & Plan:  She reports stable, she is grieving death of her , reviewed increasing dose with her family but I'm not sure she is even taking. Family will check pill bottles and call back and let me know. Assuming she is not taking medications she will restart medications  Orders:  -     escitalopram oxalate (LEXAPRO) 10 mg tablet; Take 1 Tablet by mouth daily. , Normal, Disp-90 Tablet, R-0  6. Gastroesophageal reflux disease without esophagitis  Assessment & Plan:  Well controlled, not taking medications, will stay off medications, reviewed diet triggers. 7. Seasonal allergic rhinitis, unspecified trigger  Assessment & Plan:  Well controlled per her, she is not taking medications, last filled > 1yr ago, will stop. 8. Pure hypercholesterolemia  -     METABOLIC PANEL, COMPREHENSIVE; Future  -     CBC W/O DIFF; Future  -     LIPID PANEL; Future  9. Age-related osteoporosis without current pathological fracture  Assessment & Plan:  Unknown control, followed by specialist, she reports being do for injection.   Will call over to specialist (Dr Marion Geiger old office) to get dates of last shot. Will either get her set up her prior to moving or okay to hold off until she does move   10. Excessive cerumen in both ear canals  -     REMOVAL IMPACTED CERUMEN IRRIGATION/LVG UNILAT      I called and spoke to daughter. Pt is not taking any medications except AED. All other pill bottles are full and out of date. On this date 03/29/2022 I have spent 45 minutes reviewing previous notes, test results, discussing plan with her family over the phone, and face to face with the patient discussing the diagnosis and importance of compliance with the treatment plan as well as documenting on the day of the visit. Follow-up and Dispositions    · Return if symptoms worsen or fail to improve. Subjective:   Crystal Charlton is here today for a full physical.      Annual Wellness Visit- Subsequent Visit    Since last visit:  passed away. She is moving to [place] to be near family. She cancelled/refused to come to her [facility] last year. Last visit with me was in 2020. End of Life Planning: This was discussed with her today and she has an advanced directive - a copy has been provided. Reviewed DNR/DNI and patient is interested in remaining a DNR, no changes made. Depression Screen:  3 most recent PHQ Screens 3/29/2022   Little interest or pleasure in doing things Not at all   Feeling down, depressed, irritable, or hopeless Not at all   Total Score PHQ 2 0         Fall Risk:   Fall Risk Assessment, last 12 mths 3/29/2022   Able to walk? Yes   Fall in past 12 months? 0   Do you feel unsteady? 0   Are you worried about falling 0   Is TUG test greater than 12 seconds? -   Is the gait abnormal? -   Number of falls in past 12 months -   Fall with injury? -       Abuse Screen:  Abuse Screening Questionnaire 3/29/2022   Do you ever feel afraid of your partner? N   Are you in a relationship with someone who physically or mentally threatens you? N   Is it safe for you to go home?  Y       Do you average more than 1 drink per night or more than 7 drinks a week:  No    On any one occasion in the past three months have you have had more than 3 drinks containing alcohol:  No          Health Maintenance:   Daily Aspirin: no  Bone Density: unknown, record requested    Immunizations:   Covid: up to date  Influenza: up to date  Tetanus: up to date  Shingles: up to date  Pneumonia: up to date  Cancer screening:   Cervical: reviewed guidelines, n/a  Breast: reviewed guidelines, unknown, record requested  Colon: reviewed guidelines, n/a       Functional Ability and Level of Safety:    Hearing: Hearing is not ideal, she does wear hearing aides    Cognition Screen:   Has your family/caregiver stated any concerns about your memory: no  Cognitive Screening: Abnormal - MMSE (Mini Mental Status Exam) - 23/30     Ambulation: with no difficulty     Activities of Daily Living: The home contains: handrails and grab bars  Patient does total self care  Exercise: very active    Adult Nutrition Screen:  No risk factors noted. Patient Care Team:  Jessica Jaimes MD as PCP - General (Internal Medicine)  Jessica Jaimes MD as PCP - [facility] Empaneled Provider  Sonny Bowers MD (Neurology)  Daron Paredes DO (Ophthalmology)       The following sections were reviewed & updated as appropriate: Problem List, Allergies, PMH, PSH, FH, and SH. Prior to Admission medications    Medication Sig Start Date End Date Taking? Authorizing Provider   lacosamide (VIMPAT) 100 mg tab tablet Take 1 Tablet by mouth two (2) times a day. Max Daily Amount: 200 mg. 9/21/21  Yes Sonny Bowers MD   omeprazole (PRILOSEC) 40 mg capsule TAKE 1 CAPSULE DAILY 11/29/20  Yes Jessica Jaimes MD   montelukast (SINGULAIR) 10 mg tablet Take 1 Tab by mouth daily.  11/24/20  Yes Jessica Jaimes MD   simvastatin (ZOCOR) 40 mg tablet TAKE 1 TABLET BY MOUTH NIGHTLY 11/12/20  Yes Jessica Jaimes MD   Denosumab (PROLIA) 60 mg/mL injection 60 mg by SubCUTAneous route. Every 6 months   Yes Provider, Historical   calcium-vitamin D (CALCIUM 500+D) 500 mg(1,250mg) -200 unit per tablet Take 1 Tablet by mouth two (2) times daily (with meals). Yes Provider, Historical   escitalopram oxalate (LEXAPRO) 10 mg tablet TAKE ONE TABLET BY MOUTH ONE TIME DAILY  9/26/21   Isreal Contreras MD   CHOLECALCIFEROL, VITAMIN D3, (VITAMIN D3 PO) Take  by mouth. Patient not taking: Reported on 3/29/2022    Provider, Historical          Review of Systems   Constitutional: Negative for chills and fever. Respiratory: Negative for cough and shortness of breath. Cardiovascular: Negative for chest pain and palpitations. Gastrointestinal: Positive for nausea. Negative for abdominal pain, blood in stool, constipation, diarrhea, heartburn and vomiting. Musculoskeletal: Negative for joint pain and myalgias. Neurological: Negative for tingling, focal weakness and headaches. Endo/Heme/Allergies: Does not bruise/bleed easily. Psychiatric/Behavioral: Negative for depression (but reports sad w/  passing). The patient is not nervous/anxious and does not have insomnia. Objective:   Physical Exam  Constitutional:       General: She is not in acute distress. Appearance: Normal appearance. HENT:      Ears:      Comments: Right ear is: 50% occluded with hard cerumen. Left ear is: 50% occluded with hard cerumen. Eyes:      Conjunctiva/sclera: Conjunctivae normal.   Cardiovascular:      Rate and Rhythm: Regular rhythm. Heart sounds: No murmur heard. Pulmonary:      Effort: Pulmonary effort is normal.      Breath sounds: Normal breath sounds. No decreased breath sounds or wheezing. Abdominal:      General: Bowel sounds are normal.      Palpations: Abdomen is soft. Tenderness: There is no abdominal tenderness. Musculoskeletal:      Right lower leg: No edema. Left lower leg: No edema.    Psychiatric:         Mood and Affect: Affect normal.          Visit Vitals  /70   Pulse 82   Temp 98.1 °F (36.7 °C) (Oral)   Resp 16   Ht 5' 8\" (1.727 m)   Wt 136 lb 6.4 oz (61.9 kg)   SpO2 97%   BMI 20.74 kg/m²          Mary Lou Braswell MD

## 2022-03-29 NOTE — ASSESSMENT & PLAN NOTE
New dx, MMSE 23/30, based on family reports this is not new and has been going on for years. Agree w/ moving to [place] and being near family. Reviewed expectations w/ change.   Will defer starting medications to her new PCP and family is okay with this plan

## 2022-03-30 LAB
ALBUMIN SERPL-MCNC: 3.2 G/DL (ref 3.5–5)
ALBUMIN/GLOB SERPL: 0.9 {RATIO} (ref 1.1–2.2)
ALP SERPL-CCNC: 113 U/L (ref 45–117)
ALT SERPL-CCNC: 26 U/L (ref 12–78)
ANION GAP SERPL CALC-SCNC: 1 MMOL/L (ref 5–15)
AST SERPL-CCNC: 20 U/L (ref 15–37)
BILIRUB SERPL-MCNC: 0.2 MG/DL (ref 0.2–1)
BUN SERPL-MCNC: 20 MG/DL (ref 6–20)
BUN/CREAT SERPL: 29 (ref 12–20)
CALCIUM SERPL-MCNC: 8.6 MG/DL (ref 8.5–10.1)
CHLORIDE SERPL-SCNC: 108 MMOL/L (ref 97–108)
CHOLEST SERPL-MCNC: 173 MG/DL
CO2 SERPL-SCNC: 32 MMOL/L (ref 21–32)
CREAT SERPL-MCNC: 0.69 MG/DL (ref 0.55–1.02)
ERYTHROCYTE [DISTWIDTH] IN BLOOD BY AUTOMATED COUNT: 13.5 % (ref 11.5–14.5)
GLOBULIN SER CALC-MCNC: 3.7 G/DL (ref 2–4)
GLUCOSE SERPL-MCNC: 108 MG/DL (ref 65–100)
HCT VFR BLD AUTO: 45.3 % (ref 35–47)
HDLC SERPL-MCNC: 43 MG/DL
HDLC SERPL: 4 {RATIO} (ref 0–5)
HGB BLD-MCNC: 14.4 G/DL (ref 11.5–16)
LDLC SERPL CALC-MCNC: 52.8 MG/DL (ref 0–100)
MCH RBC QN AUTO: 29.4 PG (ref 26–34)
MCHC RBC AUTO-ENTMCNC: 31.8 G/DL (ref 30–36.5)
MCV RBC AUTO: 92.4 FL (ref 80–99)
NRBC # BLD: 0 K/UL (ref 0–0.01)
NRBC BLD-RTO: 0 PER 100 WBC
PLATELET # BLD AUTO: 301 K/UL (ref 150–400)
PMV BLD AUTO: 12.1 FL (ref 8.9–12.9)
POTASSIUM SERPL-SCNC: 4.1 MMOL/L (ref 3.5–5.1)
PROT SERPL-MCNC: 6.9 G/DL (ref 6.4–8.2)
RBC # BLD AUTO: 4.9 M/UL (ref 3.8–5.2)
SODIUM SERPL-SCNC: 141 MMOL/L (ref 136–145)
TRIGL SERPL-MCNC: 386 MG/DL (ref ?–150)
VLDLC SERPL CALC-MCNC: 77.2 MG/DL
WBC # BLD AUTO: 7.4 K/UL (ref 3.6–11)

## 2022-03-30 NOTE — PROGRESS NOTES
Records requested from Dr Klaia Arce and if Pt. Ever received a prolia injection there.  Dr Hosea Snyder has been retired 2 yrs now,

## 2022-03-30 NOTE — PROGRESS NOTES
Please call patient. [facility]ica [facility]: [phone]). All labs are stable or at goal except for elevated TG and BS, but non-fasting. Cholesterol looks good, she has not been taking medications. I would not restart simvastatin.

## 2022-03-30 NOTE — PROGRESS NOTES
Spoke w/ daughter (and patient on speaker phone), advised to not restart the simvastatin.  Pt's daughter expressed understanding

## 2022-04-12 DIAGNOSIS — G30.1 LATE ONSET ALZHEIMER'S DEMENTIA WITHOUT BEHAVIORAL DISTURBANCE (HCC): Primary | ICD-10-CM

## 2022-04-12 DIAGNOSIS — F02.80 LATE ONSET ALZHEIMER'S DEMENTIA WITHOUT BEHAVIORAL DISTURBANCE (HCC): Primary | ICD-10-CM

## 2022-04-12 RX ORDER — RIVASTIGMINE TARTRATE 1.5 MG/1
1.5 CAPSULE ORAL 2 TIMES DAILY WITH MEALS
Qty: 60 CAPSULE | Refills: 2 | Status: SHIPPED | OUTPATIENT
Start: 2022-04-12 | End: 2022-08-17

## 2022-04-26 DIAGNOSIS — G45.1 TRANSIENT ISCHEMIC ATTACK INVOLVING LEFT INTERNAL CAROTID ARTERY: ICD-10-CM

## 2022-04-26 DIAGNOSIS — I65.23 STENOSIS OF BOTH CAROTID ARTERIES WITHOUT INFARCTION: ICD-10-CM

## 2022-04-26 DIAGNOSIS — G40.209 COMPLEX PARTIAL SEIZURE WITH IMPAIRMENT OF CONSCIOUSNESS (HCC): ICD-10-CM

## 2022-04-26 DIAGNOSIS — I67.89 CEREBRAL MICROVASCULAR DISEASE: ICD-10-CM

## 2022-04-26 DIAGNOSIS — R41.82 ALTERED MENTAL STATUS, UNSPECIFIED ALTERED MENTAL STATUS TYPE: ICD-10-CM

## 2022-04-26 DIAGNOSIS — R42 DIZZINESS AND GIDDINESS: ICD-10-CM

## 2022-04-26 RX ORDER — LACOSAMIDE 100 MG/1
100 TABLET ORAL 2 TIMES DAILY
Qty: 180 TABLET | Refills: 5 | Status: SHIPPED | OUTPATIENT
Start: 2022-04-26

## 2022-04-26 NOTE — TELEPHONE ENCOUNTER
Pt called for refill of:    Requested Prescriptions     Pending Prescriptions Disp Refills    lacosamide (VIMPAT) 100 mg tab tablet 180 Tablet 5     Sig: Take 1 Tablet by mouth two (2) times a day. Max Daily Amount: 200 mg.      [facility]    LOV: 12/20/2021  NOV: 12/20/2022

## 2022-08-01 ENCOUNTER — TELEPHONE (OUTPATIENT)
Dept: INTERNAL MEDICINE CLINIC | Age: 87
End: 2022-08-01

## 2022-08-01 DIAGNOSIS — Z11.1 SCREENING FOR TUBERCULOSIS: Primary | ICD-10-CM

## 2022-08-01 NOTE — TELEPHONE ENCOUNTER
Spoke with daughter TB test ordered they can go to any [address] [place] [address] location to get this done. Patient verbalized understanding.

## 2022-08-01 NOTE — TELEPHONE ENCOUNTER
----- Message from [facility] sent at 7/29/2022  4:34 PM EDT -----  Subject: Message to Provider    QUESTIONS  Information for Provider? Pt's daughter Melody Mina called in and stated her   mother needs a TB test to go to assisted living. She said she was told   that it has to be read at pharmacy but there is no pharmacy pt that does   it. Asking for a call back to clarify. [phone]  ---------------------------------------------------------------------------  --------------  Kaycee Patricia INFO  [phone]; OK to leave message on voicemail  ---------------------------------------------------------------------------  --------------  SCRIPT ANSWERS  Relationship to Patient?  Self

## 2022-08-09 ENCOUNTER — TELEPHONE (OUTPATIENT)
Dept: INTERNAL MEDICINE CLINIC | Age: 87
End: 2022-08-09

## 2022-08-16 LAB
GAMMA INTERFERON BACKGROUND BLD IA-ACNC: 0.01 IU/ML
M TB IFN-G BLD-IMP: ABNORMAL
M TB IFN-G CD4+ BCKGRND COR BLD-ACNC: 0.01 IU/ML
MITOGEN IGNF BLD-ACNC: 0.08 IU/ML
QUANTIFERON INCUBATION, QF1T: ABNORMAL
QUANTIFERON TB2 AG: 0.02 IU/ML
SERVICE CMNT-IMP: ABNORMAL

## 2022-08-17 ENCOUNTER — HOSPITAL ENCOUNTER (OUTPATIENT)
Age: 87
Setting detail: OUTPATIENT SURGERY
Discharge: HOME | End: 2022-08-17
Attending: OPHTHALMOLOGY | Admitting: OPHTHALMOLOGY
Payer: MEDICARE

## 2022-08-17 VITALS
BODY MASS INDEX: 20.4 KG/M2 | DIASTOLIC BLOOD PRESSURE: 65 MMHG | OXYGEN SATURATION: 99 % | HEIGHT: 67 IN | HEART RATE: 70 BPM | WEIGHT: 130 LBS | SYSTOLIC BLOOD PRESSURE: 155 MMHG | TEMPERATURE: 98.4 F | RESPIRATION RATE: 17 BRPM

## 2022-08-17 DIAGNOSIS — R42 DIZZINESS AND GIDDINESS: ICD-10-CM

## 2022-08-17 DIAGNOSIS — G45.1 TRANSIENT ISCHEMIC ATTACK INVOLVING LEFT INTERNAL CAROTID ARTERY: ICD-10-CM

## 2022-08-17 DIAGNOSIS — G40.209 COMPLEX PARTIAL SEIZURE WITH IMPAIRMENT OF CONSCIOUSNESS (HCC): ICD-10-CM

## 2022-08-17 DIAGNOSIS — I67.89 CEREBRAL MICROVASCULAR DISEASE: ICD-10-CM

## 2022-08-17 DIAGNOSIS — I65.23 STENOSIS OF BOTH CAROTID ARTERIES WITHOUT INFARCTION: ICD-10-CM

## 2022-08-17 DIAGNOSIS — R41.82 ALTERED MENTAL STATUS, UNSPECIFIED ALTERED MENTAL STATUS TYPE: ICD-10-CM

## 2022-08-17 PROBLEM — H26.492 POSTERIOR CAPSULAR OPACIFICATION VISUALLY SIGNIFICANT OF LEFT EYE: Status: ACTIVE | Noted: 2022-08-17

## 2022-08-17 PROCEDURE — 2709999900 HC NON-CHARGEABLE SUPPLY: Performed by: OPHTHALMOLOGY

## 2022-08-17 PROCEDURE — 74011000250 HC RX REV CODE- 250: Performed by: OPHTHALMOLOGY

## 2022-08-17 PROCEDURE — 76030000017 HC AMB SURG FIRST 0.5 HR INTENSV-TIER 1: Performed by: OPHTHALMOLOGY

## 2022-08-17 PROCEDURE — 74011250637 HC RX REV CODE- 250/637: Performed by: OPHTHALMOLOGY

## 2022-08-17 RX ORDER — TROPICAMIDE 10 MG/ML
SOLUTION/ DROPS OPHTHALMIC
Status: DISCONTINUED
Start: 2022-08-17 | End: 2022-08-17 | Stop reason: HOSPADM

## 2022-08-17 RX ORDER — TROPICAMIDE 10 MG/ML
1 SOLUTION/ DROPS OPHTHALMIC ONCE
Status: COMPLETED | OUTPATIENT
Start: 2022-08-17 | End: 2022-08-17

## 2022-08-17 RX ORDER — PREDNISOLONE ACETATE 10 MG/ML
1 SUSPENSION/ DROPS OPHTHALMIC ONCE
Status: COMPLETED | OUTPATIENT
Start: 2022-08-17 | End: 2022-08-17

## 2022-08-17 RX ORDER — PROPARACAINE HYDROCHLORIDE 5 MG/ML
1 SOLUTION/ DROPS OPHTHALMIC ONCE
Status: COMPLETED | OUTPATIENT
Start: 2022-08-17 | End: 2022-08-17

## 2022-08-17 RX ADMIN — BALANCED SALT SOLUTION 20 DROP: 6.4; .75; .48; .3; 3.9; 1.7 SOLUTION OPHTHALMIC at 11:28

## 2022-08-17 RX ADMIN — PREDNISOLONE ACETATE 1 DROP: 10 SUSPENSION/ DROPS OPHTHALMIC at 11:29

## 2022-08-17 RX ADMIN — TROPICAMIDE 1 DROP: 10 SOLUTION/ DROPS OPHTHALMIC at 10:00

## 2022-08-17 RX ADMIN — HYPROMELLOSE 2910 (4000 MPA.S) 1 DROP: 25 SOLUTION/ DROPS OPHTHALMIC at 11:27

## 2022-08-17 RX ADMIN — PROPARACAINE HYDROCHLORIDE 1 DROP: 5 SOLUTION/ DROPS OPHTHALMIC at 10:00

## 2022-08-17 NOTE — OP NOTES
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 ANESTHESIA: Topical.    LASER PARAMETERS:  Power:  2.2 millijoules per shot. Total shots delivered:  75    Estimated blood loss:None  Complications:None  Specimen removed: None    DESCRIPTION OF PROCEDURE: The patient was brought to the holding area where she received several instillations of Mydriacyl 1%, Tyson-Synephrine 2.5%, and tetracaine until adequate pupillary dilatation was achieved. The patient's vital signs were recorded. The patient was then brought to the laser suite and received 1 drop of tetracaine preoperatively. The patient was then seated at the laser and the Otis capsulotomy lens was placed on the eye. The patient's posterior capsular opacification was then cleared utilizing the laser. Following this the eye was rinsed with BSS solution to remove the Goniosol solution from the surface of the eye, and the patient received 1 drop of fluorometholone drops in the operated eye. Iopidine was used at the discretion of the surgeon depending on the amount of energy necessary to clear the opacified capsule. Instructions were given to the patient verbally and written to use her FML drops 3 times a day at 9 a.m., 3 p.m., and 9 p.m. for the next 3 days. The patient will be following up with us postoperatively in the office.     [address] [place] [address]  8/17/2022

## 2022-08-18 ENCOUNTER — PATIENT MESSAGE (OUTPATIENT)
Dept: INTERNAL MEDICINE CLINIC | Age: 87
End: 2022-08-18

## 2022-08-21 DIAGNOSIS — Z11.1 TUBERCULOSIS SCREENING: Primary | ICD-10-CM

## 2022-08-22 NOTE — PROGRESS NOTES
Please call patient's POA. TB test indeterminate. Needs to get CXR done. Order placed. Give them time and location information.

## 2022-08-23 ENCOUNTER — HOSPITAL ENCOUNTER (OUTPATIENT)
Dept: GENERAL RADIOLOGY | Age: 87
Discharge: HOME | End: 2022-08-23
Attending: INTERNAL MEDICINE
Payer: MEDICARE

## 2022-08-23 ENCOUNTER — OFFICE VISIT (OUTPATIENT)
Dept: INTERNAL MEDICINE CLINIC | Age: 87
End: 2022-08-23
Payer: MEDICARE

## 2022-08-23 VITALS
TEMPERATURE: 98.3 F | BODY MASS INDEX: 21.38 KG/M2 | HEART RATE: 78 BPM | RESPIRATION RATE: 17 BRPM | WEIGHT: 136.2 LBS | HEIGHT: 67 IN | DIASTOLIC BLOOD PRESSURE: 70 MMHG | OXYGEN SATURATION: 98 % | SYSTOLIC BLOOD PRESSURE: 134 MMHG

## 2022-08-23 DIAGNOSIS — Z11.1 TUBERCULOSIS SCREENING: ICD-10-CM

## 2022-08-23 DIAGNOSIS — M81.0 AGE-RELATED OSTEOPOROSIS WITHOUT CURRENT PATHOLOGICAL FRACTURE: ICD-10-CM

## 2022-08-23 DIAGNOSIS — Z02.89 ENCOUNTER FOR COMPLETION OF FORM WITH PATIENT: Primary | ICD-10-CM

## 2022-08-23 DIAGNOSIS — F02.80 LATE ONSET ALZHEIMER'S DEMENTIA WITHOUT BEHAVIORAL DISTURBANCE (HCC): ICD-10-CM

## 2022-08-23 DIAGNOSIS — G40.209 COMPLEX PARTIAL SEIZURE WITH IMPAIRMENT OF CONSCIOUSNESS (HCC): ICD-10-CM

## 2022-08-23 DIAGNOSIS — G30.1 LATE ONSET ALZHEIMER'S DEMENTIA WITHOUT BEHAVIORAL DISTURBANCE (HCC): ICD-10-CM

## 2022-08-23 DIAGNOSIS — F33.41 RECURRENT MAJOR DEPRESSIVE DISORDER, IN PARTIAL REMISSION (HCC): ICD-10-CM

## 2022-08-23 PROCEDURE — G9717 DOC PT DX DEP/BP F/U NT REQ: HCPCS | Performed by: INTERNAL MEDICINE

## 2022-08-23 PROCEDURE — G8420 CALC BMI NORM PARAMETERS: HCPCS | Performed by: INTERNAL MEDICINE

## 2022-08-23 PROCEDURE — G8536 NO DOC ELDER MAL SCRN: HCPCS | Performed by: INTERNAL MEDICINE

## 2022-08-23 PROCEDURE — 71046 X-RAY EXAM CHEST 2 VIEWS: CPT

## 2022-08-23 PROCEDURE — 1090F PRES/ABSN URINE INCON ASSESS: CPT | Performed by: INTERNAL MEDICINE

## 2022-08-23 PROCEDURE — 99213 OFFICE O/P EST LOW 20 MIN: CPT | Performed by: INTERNAL MEDICINE

## 2022-08-23 PROCEDURE — 1101F PT FALLS ASSESS-DOCD LE1/YR: CPT | Performed by: INTERNAL MEDICINE

## 2022-08-23 PROCEDURE — G0463 HOSPITAL OUTPT CLINIC VISIT: HCPCS | Performed by: INTERNAL MEDICINE

## 2022-08-23 PROCEDURE — G8427 DOCREV CUR MEDS BY ELIG CLIN: HCPCS | Performed by: INTERNAL MEDICINE

## 2022-08-23 NOTE — PROGRESS NOTES
Venecia White is a 82 y.o. female who was seen in clinic today (8/23/2022). She RTC with family. Assessment & Plan:   Below is the assessment and plan developed based on review of pertinent history, physical exam, labs, studies, and medications. 1. Encounter for completion of form with patient  2. Late onset Alzheimer's dementia without behavioral disturbance (HCC)  Assessment & Plan:  Stable to slightly worse, never took medications regularly, will defer to new PCP but did review w/ family med options (pills vs patches) but also the limited benefits of med   3. Complex partial seizure with impairment of consciousness [facility])  Assessment & Plan:  Asymptomatic, continue current meds   4. Recurrent major depressive disorder, in partial remission ([facility] [address]  Assessment & Plan:  Stable to slightly worse, mostly all around the move, never took medications regularly, reviewed expectations w/ family and it continue to hold medication for now   5. Age-related osteoporosis without current pathological fracture  Assessment & Plan:  Asymptomatic, has been getting Prolia thru OBGYN, no current records available        On this date 08/23/2022 I have spent 25 minutes reviewing previous notes, test results and face to face with the patient discussing the diagnosis and importance of compliance with the treatment plan as well as documenting on the day of the visit. Form completed. Follow-up and Dispositions    Return for  - No follow up needed. Subjective/Objective:   Iron Orozco was seen today for Form Completion      Since last visit: she will be moving to [facility] in [place]. Form reviewed with patient and family. Quantiferon gold was inconclusive. CXR was done this morning and is w/o evidence of TB. She is not happy she has to move. She does not understand why she needs to move. She will miss [place]. Reviewed multiple times the rational for why she should move but keeps asking the same questions.     Prior to Admission medications    Medication Sig Start Date End Date Taking? Authorizing Provider   lacosamide (VIMPAT) 100 mg tab tablet Take 1 Tablet by mouth two (2) times a day. Max Daily Amount: 200 mg. Patient taking differently: Take 100 mg by mouth daily. 4/26/22  Yes Carol Patel MD   denosumab (PROLIA) 60 mg/mL injection 60 mg by SubCUTAneous route. Every 6 months   Yes Provider, Historical   escitalopram oxalate (LEXAPRO) 10 mg tablet Take 1 Tablet by mouth daily. Patient not taking: Reported on 8/23/2022 3/29/22   Candi Altamirano MD   CHOLECALCIFEROL, VITAMIN D3, (VITAMIN D3 PO) Take  by mouth. Patient not taking: Reported on 8/23/2022    Provider, Historical   calcium-vitamin D (OS-CHAD +D3) 500 mg-200 unit per tablet Take 1 Tablet by mouth two (2) times daily (with meals). Patient not taking: No sig reported    Provider, Historical        Review of Systems   Unable to perform ROS: Dementia      Physical Exam  Constitutional:       General: She is not in acute distress. Eyes:      Extraocular Movements: Extraocular movements intact. Conjunctiva/sclera: Conjunctivae normal.      Pupils: Pupils are equal, round, and reactive to light. Cardiovascular:      Rate and Rhythm: Regular rhythm. Heart sounds: No murmur heard. Pulmonary:      Effort: Pulmonary effort is normal.      Breath sounds: Normal breath sounds. No decreased breath sounds or wheezing. Abdominal:      General: Bowel sounds are normal.      Palpations: Abdomen is soft. There is no hepatomegaly or splenomegaly. Tenderness: no abdominal tenderness   Musculoskeletal:      Right lower leg: No edema. Left lower leg: No edema.    Psychiatric:         Mood and Affect: Mood and affect normal.         Behavior: Behavior normal.     Visit Vitals  BP (!) 153/81   Pulse 78   Temp 98.3 °F (36.8 °C) (Temporal)   Resp 17   Ht 5' 7\" (1.702 m)   Wt 136 lb 3.2 oz (61.8 kg)   SpO2 98%   BMI 21.33 kg/m²       Nichol Hwang MD

## 2022-08-23 NOTE — ASSESSMENT & PLAN NOTE
Stable to slightly worse, never took medications regularly, will defer to new PCP but did review w/ family med options (pills vs patches) but also the limited benefits of med

## 2022-08-23 NOTE — ASSESSMENT & PLAN NOTE
Stable to slightly worse, mostly all around the move, never took medications regularly, reviewed expectations w/ family and it continue to hold medication for now

## 2022-08-24 ENCOUNTER — DOCUMENTATION ONLY (OUTPATIENT)
Dept: INTERNAL MEDICINE CLINIC | Age: 87
End: 2022-08-24

## 2022-08-24 ENCOUNTER — PATIENT MESSAGE (OUTPATIENT)
Dept: INTERNAL MEDICINE CLINIC | Age: 87
End: 2022-08-24

## 2022-08-24 NOTE — PROGRESS NOTES
Faxed New resident forms to [address] @ [phone]. Received confirmation same day. Will have documents scanned into chart. . please advise Odalys Rios

## 2022-08-25 ENCOUNTER — DOCUMENTATION ONLY (OUTPATIENT)
Dept: INTERNAL MEDICINE CLINIC | Age: 87
End: 2022-08-25
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
## 2022-09-02 ENCOUNTER — TELEPHONE (OUTPATIENT)
Dept: INTERNAL MEDICINE CLINIC | Age: 87
End: 2022-09-02

## 2022-09-02 DIAGNOSIS — F41.9 ANXIETY: Primary | ICD-10-CM

## 2022-09-02 RX ORDER — ALPRAZOLAM 0.25 MG/1
.25-.5 TABLET ORAL
Qty: 2 TABLET | Refills: 0 | Status: SHIPPED | OUTPATIENT
Start: 2022-09-02

## 2022-09-02 NOTE — TELEPHONE ENCOUNTER
Pt's daughter Maricel Press notified of xanax and side effects. They are taking Pt. To [email or web address] tues. Night to her son's and the moving truck is coming wed.

## 2022-09-02 NOTE — TELEPHONE ENCOUNTER
Reason for call:  Is there anything you can give the patient for  4 hour car ride to calm her down?     Is this a new problem: yes     Date of last appointment:  8/23/2022     Can we respond via [employer]: no    Best call back number:  Wilfrid Leblanc [phone]

## 2022-09-02 NOTE — TELEPHONE ENCOUNTER
[place]. Chart reviewed. She is not driving.  not reviewed. Notify family about side effects. Give her 1 prior to leaving if she needs another one in an hour (ie is not helping) they can give her the 2nd pill.

## 2022-09-07 ENCOUNTER — TELEPHONE (OUTPATIENT)
Dept: INTERNAL MEDICINE CLINIC | Age: 87
End: 2022-09-07

## 2022-09-15 ENCOUNTER — TELEPHONE (OUTPATIENT)
Dept: INTERNAL MEDICINE CLINIC | Age: 87
End: 2022-09-15

## 2022-09-21 ENCOUNTER — TELEPHONE (OUTPATIENT)
Dept: INTERNAL MEDICINE CLINIC | Age: 87
End: 2022-09-21

## 2022-09-21 NOTE — TELEPHONE ENCOUNTER
Faxed dexa scan and immunizations to [address] @ [phone] and [phone]. Confirmation received.

## 2022-09-21 NOTE — TELEPHONE ENCOUNTER
Faxed dexa scan and immunizations to [address] @ [phone] and [phone]. Confirmation received.

## 2022-09-21 NOTE — TELEPHONE ENCOUNTER
Reason for call:   living facility, [facility], where Pt  is staying needs a copy of her last bone density test, an vaccine records Madaieric Sirisha requested to be send by fax [phone].   Please call Caesar Grimm from [facility] at [phone]  Ext [phone]    Is this a new problem: yes     Date of last appointment:  8/23/2022     Can we respond via [employer]: no    Best call back number: Caesar Grimm [phone]

## 2022-09-21 NOTE — TELEPHONE ENCOUNTER
Reason for call:    Patient's son, Moses Kan, called. He said the assisted living facility, [facility], where his mother is staying needs a copy of her last bone density test.  He asked that we contact Jean Kaplan, [phone], to find out where to send it.     Is this a new problem: yes     Date of last appointment:  8/23/2022     Can we respond via [employer]: no    Best call back number:     Moses Kan - [phone]

## 2022-10-13 ENCOUNTER — TELEPHONE (OUTPATIENT)
Dept: INTERNAL MEDICINE CLINIC | Age: 87
End: 2022-10-13

## 2022-10-13 NOTE — TELEPHONE ENCOUNTER
Reason for call:    Patient called and said she would like to speak to Dr. Connie Quinonez. She said her daughter told her that Dr. Connie Quinonez said she has Alzheimers. She would like to hear this from Dr. Connie Quinonez.     Is this a new problem: yes     Date of last appointment:  8/23/2022     Can we respond via [employer]: no    Best call back number:    Christine Guarneri - [phone]
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
## (undated) DEVICE — ADULT SPO2 SENSOR: Brand: NELLCOR